# Patient Record
Sex: FEMALE | Race: WHITE | URBAN - METROPOLITAN AREA
[De-identification: names, ages, dates, MRNs, and addresses within clinical notes are randomized per-mention and may not be internally consistent; named-entity substitution may affect disease eponyms.]

---

## 2017-11-09 ENCOUNTER — OFFICE VISIT (OUTPATIENT)
Dept: FAMILY MEDICINE CLINIC | Age: 22
End: 2017-11-09

## 2017-11-09 VITALS
TEMPERATURE: 98.4 F | SYSTOLIC BLOOD PRESSURE: 102 MMHG | RESPIRATION RATE: 24 BRPM | DIASTOLIC BLOOD PRESSURE: 62 MMHG | OXYGEN SATURATION: 98 % | HEIGHT: 65 IN | HEART RATE: 87 BPM | BODY MASS INDEX: 23.69 KG/M2 | WEIGHT: 142.2 LBS

## 2017-11-09 DIAGNOSIS — Z00.00 ROUTINE GENERAL MEDICAL EXAMINATION AT A HEALTH CARE FACILITY: Primary | ICD-10-CM

## 2017-11-09 DIAGNOSIS — M54.31 SCIATICA OF RIGHT SIDE: ICD-10-CM

## 2017-11-09 RX ORDER — NORGESTIMATE AND ETHINYL ESTRADIOL 7DAYSX3 28
KIT ORAL
COMMUNITY
End: 2020-12-07

## 2017-11-09 NOTE — MR AVS SNAPSHOT
Visit Information Date & Time Provider Department Dept. Phone Encounter #  
 11/9/2017  8:00 AM Shelby Livingston MD [facility] [phone] [id number] Upcoming Health Maintenance Date Due DTaP/Tdap/Td series (1 - Tdap) 7/17/2017 PAP AKA CERVICAL CYTOLOGY 7/17/2017 Influenza Age 5 to Adult 8/1/2017 HPV AGE 9Y-26Y (2 of 3 - Female 3 Dose Series) 1/4/2018 Allergies as of 11/9/2017  Review Complete On: 11/9/2017 By: Shelby Livingston MD  
  
 Severity Noted Reaction Type Reactions Sulfa (Sulfonamide Antibiotics)  11/09/2017    Swelling Current Immunizations  Never Reviewed No immunizations on file. Not reviewed this visit You Were Diagnosed With   
  
 Codes Comments Routine general medical examination at a health care facility    -  Primary ICD-10-CM: Z00.00 ICD-9-CM: V70.0 Sciatica of right side     ICD-10-CM: M54.31 
ICD-9-CM: 724.3 Vitals BP Pulse Temp Resp Height(growth percentile) Weight(growth percentile) 102/62 (BP 1 Location: Left arm, BP Patient Position: Sitting) 87 98.4 °F (36.9 °C) (Oral) 24 5' 5\" (1.651 m) 142 lb 3.2 oz (64.5 kg) LMP SpO2 BMI OB Status Smoking Status 10/17/2017 98% 23.66 kg/m2 Having regular periods Never Smoker Vitals History BMI and BSA Data Body Mass Index Body Surface Area  
 23.66 kg/m 2 1.72 m 2 Your Updated Medication List  
  
   
This list is accurate as of: 11/9/17  8:22 AM.  Always use your most recent med list.  
  
  
  
  
 ORTHO TRI-CYCLEN (28) 0.18/0.215/0.25 mg-35 mcg (28) Tab Generic drug:  norgestimate-ethinyl estradiol Take  by mouth. We Performed the Following REFERRAL TO PHYSICAL THERAPY [MSE99 Custom] Comments:  
 Please evaluate patient and continue treatment for right lumbar/hip pain. [facility] Kaila Valdez Referral Information Referral ID Referred By Referred To [id number] Lester Tomlinson Not Available Visits Status Start Date End Date 1 New Request 11/9/17 11/9/18 If your referral has a status of pending review or denied, additional information will be sent to support the outcome of this decision. Patient Instructions Anticipatory guidance and recommendations provided verbally and with printed information. Return for routine follow up in 1 year, sooner with any problems. Well Visit, Ages 25 to 48: Care Instructions Your Care Instructions Physical exams can help you stay healthy. Your doctor has checked your overall health and may have suggested ways to take good care of yourself. He or she also may have recommended tests. At home, you can help prevent illness with healthy eating, regular exercise, and other steps. Follow-up care is a key part of your treatment and safety. Be sure to make and go to all appointments, and call your doctor if you are having problems. It's also a good idea to know your test results and keep a list of the medicines you take. How can you care for yourself at home? · Reach and stay at a healthy weight. This will lower your risk for many problems, such as obesity, diabetes, heart disease, and high blood pressure. · Get at least 30 minutes of physical activity on most days of the week. Walking is a good choice. You also may want to do other activities, such as running, swimming, cycling, or playing tennis or team sports. Discuss any changes in your exercise program with your doctor. · Do not smoke or allow others to smoke around you. If you need help quitting, talk to your doctor about stop-smoking programs and medicines. These can increase your chances of quitting for good. · Talk to your doctor about whether you have any risk factors for sexually transmitted infections (STIs). Having one sex partner (who does not have STIs and does not have sex with anyone else) is a good way to avoid these infections. · Use birth control if you do not want to have children at this time. Talk with your doctor about the choices available and what might be best for you. · Protect your skin from too much sun. When you're outdoors from 10 a.m. to 4 p.m., stay in the shade or cover up with clothing and a hat with a wide brim. Wear sunglasses that block UV rays. Even when it's cloudy, put broad-spectrum sunscreen (SPF 30 or higher) on any exposed skin. · See a dentist one or two times a year for checkups and to have your teeth cleaned. · Wear a seat belt in the car. · Drink alcohol in moderation, if at all. That means no more than 2 drinks a day for men and 1 drink a day for women. Follow your doctor's advice about when to have certain tests. These tests can spot problems early. For everyone · Cholesterol. Have the fat (cholesterol) in your blood tested after age 21. Your doctor will tell you how often to have this done based on your age, family history, or other things that can increase your risk for heart disease. · Blood pressure. Have your blood pressure checked during a routine doctor visit. Your doctor will tell you how often to check your blood pressure based on your age, your blood pressure results, and other factors. · Vision. Talk with your doctor about how often to have a glaucoma test. 
· Diabetes. Ask your doctor whether you should have tests for diabetes. · Colon cancer. Have a test for colon cancer at age 48. You may have one of several tests. If you are younger than 48, you may need a test earlier if you have any risk factors. Risk factors include whether you already had a precancerous polyp removed from your colon or whether your parent, brother, sister, or child has had colon cancer. For women · Breast exam and mammogram. Talk to your doctor about when you should have a clinical breast exam and a mammogram. Medical experts differ on whether and how often women under 50 should have these tests.  Your doctor can help you decide what is right for you. · Pap test and pelvic exam. Begin Pap tests at age 24. A Pap test is the best way to find cervical cancer. The test often is part of a pelvic exam. Ask how often to have this test. 
· Tests for sexually transmitted infections (STIs). Ask whether you should have tests for STIs. You may be at risk if you have sex with more than one person, especially if your partners do not wear condoms. For men · Tests for sexually transmitted infections (STIs). Ask whether you should have tests for STIs. You may be at risk if you have sex with more than one person, especially if you do not wear a condom. · Testicular cancer exam. Ask your doctor whether you should check your testicles regularly. · Prostate exam. Talk to your doctor about whether you should have a blood test (called a PSA test) for prostate cancer. Experts differ on whether and when men should have this test. Some experts suggest it if you are older than 39 and are -American or have a father or brother who got prostate cancer when he was younger than 72. When should you call for help? Watch closely for changes in your health, and be sure to contact your doctor if you have any problems or symptoms that concern you. Where can you learn more? Go to http://bobbi-daniel.info/. Enter P072 in the search box to learn more about \"Well Visit, Ages 25 to 48: Care Instructions. \" Current as of: May 12, 2017 Content Version: 11.4 © 7766-8631 Healthwise, Incorporated. Care instructions adapted under license by [employer] (which disclaims liability or warranty for this information). If you have questions about a medical condition or this instruction, always ask your healthcare professional. [place] [address] any warranty or liability for your use of this information. Introducing [facility]!    
 [employer] introduces [employer] patient portal. Now you can access parts of your medical record, email your doctor's office, and request medication refills online. 1. In your internet browser, go to [email or web address]. [email or web address] 2. Click on the First Time User? Click Here link in the Sign In box. You will see the New Member Sign Up page. 3. Enter your Pixelpipe Access Code exactly as it appears below. You will not need to use this code after youve completed the sign-up process. If you do not sign up before the expiration date, you must request a new code. · Pixelpipe Access Code: [email or web address] Expires: 2/7/2018  7:55 AM 
 
4. Enter the last four digits of your Social Security Number (xxxx) and Date of Birth (mm/dd/yyyy) as indicated and click Submit. You will be taken to the next sign-up page. 5. Create a Pixelpipe ID. This will be your Pixelpipe login ID and cannot be changed, so think of one that is secure and easy to remember. 6. Create a Pixelpipe password. You can change your password at any time. 7. Enter your Password Reset Question and Answer. This can be used at a later time if you forget your password. 8. Enter your e-mail address. You will receive e-mail notification when new information is available in [address] 9. Click Sign Up. You can now view and download portions of your medical record. 10. Click the Download Summary menu link to download a portable copy of your medical information. If you have questions, please visit the Frequently Asked Questions section of the Pixelpipe website. Remember, Pixelpipe is NOT to be used for urgent needs. For medical emergencies, dial 911. Now available from your iPhone and Android! Please provide this summary of care documentation to your next provider. Your primary care clinician is listed as Vonnie Laws. If you have any questions after today's visit, please call [phone].

## 2017-11-09 NOTE — PROGRESS NOTES
Fred Duran is a 25 y.o. female here to establish care       Fred Duran is a 25 y.o. female (: 1995) presenting to address:    Chief Complaint   Patient presents with   [facility]     pt here to establish care. pt also reports concerns        Vitals:    17 0800   BP: 102/62   Pulse: 87   Resp: 24   SpO2: 98%   Weight: 142 lb 3.2 oz (64.5 kg)   Height: 5' 5\" (1.651 m)   PainSc:   0 - No pain       Hearing/Vision:   No exam data present    Learning Assessment:     Learning Assessment 2017   PRIMARY LEARNER Patient   HIGHEST LEVEL OF EDUCATION - PRIMARY LEARNER  SOME COLLEGE   BARRIERS PRIMARY LEARNER NONE     NONE   PRIMARY LANGUAGE ENGLISH   LEARNER PREFERENCE PRIMARY READING   ANSWERED BY patient   RELATIONSHIP SELF     Depression Screening:     PHQ over the last two weeks 2017   Little interest or pleasure in doing things Not at all   Feeling down, depressed or hopeless Not at all   Total Score PHQ 2 0     Fall Risk Assessment:   No flowsheet data found. Abuse Screening:   No flowsheet data found. Coordination of Care Questionaire:   1. Have you been to the ER, urgent care clinic since your last visit? Hospitalized since your last visit? NO    2. Have you seen or consulted any other health care providers outside of the [address] [place] [address] since your last visit? Include any pap smears or colon screening. NO    Advanced Directive:   1. Do you have an Advanced Directive? NO    2. Would you like information on Advanced Directives?  NO
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
## 2017-11-09 NOTE — PATIENT INSTRUCTIONS
Anticipatory guidance and recommendations provided verbally and with printed information. Return for routine follow up in 1 year, sooner with any problems. Well Visit, Ages 25 to 48: Care Instructions  Your Care Instructions    Physical exams can help you stay healthy. Your doctor has checked your overall health and may have suggested ways to take good care of yourself. He or she also may have recommended tests. At home, you can help prevent illness with healthy eating, regular exercise, and other steps. Follow-up care is a key part of your treatment and safety. Be sure to make and go to all appointments, and call your doctor if you are having problems. It's also a good idea to know your test results and keep a list of the medicines you take. How can you care for yourself at home? · Reach and stay at a healthy weight. This will lower your risk for many problems, such as obesity, diabetes, heart disease, and high blood pressure. · Get at least 30 minutes of physical activity on most days of the week. Walking is a good choice. You also may want to do other activities, such as running, swimming, cycling, or playing tennis or team sports. Discuss any changes in your exercise program with your doctor. · Do not smoke or allow others to smoke around you. If you need help quitting, talk to your doctor about stop-smoking programs and medicines. These can increase your chances of quitting for good. · Talk to your doctor about whether you have any risk factors for sexually transmitted infections (STIs). Having one sex partner (who does not have STIs and does not have sex with anyone else) is a good way to avoid these infections. · Use birth control if you do not want to have children at this time. Talk with your doctor about the choices available and what might be best for you. · Protect your skin from too much sun.  When you're outdoors from 10 a.m. to 4 p.m., stay in the shade or cover up with clothing and a hat with a wide brim. Wear sunglasses that block UV rays. Even when it's cloudy, put broad-spectrum sunscreen (SPF 30 or higher) on any exposed skin. · See a dentist one or two times a year for checkups and to have your teeth cleaned. · Wear a seat belt in the car. · Drink alcohol in moderation, if at all. That means no more than 2 drinks a day for men and 1 drink a day for women. Follow your doctor's advice about when to have certain tests. These tests can spot problems early. For everyone  · Cholesterol. Have the fat (cholesterol) in your blood tested after age 21. Your doctor will tell you how often to have this done based on your age, family history, or other things that can increase your risk for heart disease. · Blood pressure. Have your blood pressure checked during a routine doctor visit. Your doctor will tell you how often to check your blood pressure based on your age, your blood pressure results, and other factors. · Vision. Talk with your doctor about how often to have a glaucoma test.  · Diabetes. Ask your doctor whether you should have tests for diabetes. · Colon cancer. Have a test for colon cancer at age 48. You may have one of several tests. If you are younger than 48, you may need a test earlier if you have any risk factors. Risk factors include whether you already had a precancerous polyp removed from your colon or whether your parent, brother, sister, or child has had colon cancer. For women  · Breast exam and mammogram. Talk to your doctor about when you should have a clinical breast exam and a mammogram. Medical experts differ on whether and how often women under 50 should have these tests. Your doctor can help you decide what is right for you. · Pap test and pelvic exam. Begin Pap tests at age 24. A Pap test is the best way to find cervical cancer. The test often is part of a pelvic exam. Ask how often to have this test.  · Tests for sexually transmitted infections (STIs).  Ask whether you should have tests for STIs. You may be at risk if you have sex with more than one person, especially if your partners do not wear condoms. For men  · Tests for sexually transmitted infections (STIs). Ask whether you should have tests for STIs. You may be at risk if you have sex with more than one person, especially if you do not wear a condom. · Testicular cancer exam. Ask your doctor whether you should check your testicles regularly. · Prostate exam. Talk to your doctor about whether you should have a blood test (called a PSA test) for prostate cancer. Experts differ on whether and when men should have this test. Some experts suggest it if you are older than 39 and are -American or have a father or brother who got prostate cancer when he was younger than 72. When should you call for help? Watch closely for changes in your health, and be sure to contact your doctor if you have any problems or symptoms that concern you. Where can you learn more? Go to http://bobbi-daniel.info/. Enter P072 in the search box to learn more about \"Well Visit, Ages 25 to 48: Care Instructions. \"  Current as of: May 12, 2017  Content Version: 11.4  © 3821-0024 Healthwise, Incorporated. Care instructions adapted under license by [employer] (which disclaims liability or warranty for this information). If you have questions about a medical condition or this instruction, always ask your healthcare professional. [place] [address] any warranty or liability for your use of this information.

## 2017-12-07 ENCOUNTER — TELEPHONE (OUTPATIENT)
Dept: FAMILY MEDICINE CLINIC | Age: 22
End: 2017-12-07

## 2017-12-07 DIAGNOSIS — M54.9 UPPER BACK PAIN: Primary | ICD-10-CM

## 2017-12-07 DIAGNOSIS — M54.2 NECK PAIN: ICD-10-CM

## 2017-12-07 NOTE — TELEPHONE ENCOUNTER
Pt called stating she was seen at [facility] today for physical therapy and wanted them to treat her upper back and neck pain but they stated she needed a new referral since it stated treatment for the right lumbar/ hip pain.

## 2017-12-07 NOTE — TELEPHONE ENCOUNTER
Please advise patient that referral request for physical therapy evaluation and treatment of upper back and neck pain is being submitted.

## 2017-12-13 ENCOUNTER — OFFICE VISIT (OUTPATIENT)
Dept: FAMILY MEDICINE CLINIC | Age: 22
End: 2017-12-13

## 2017-12-13 VITALS
HEIGHT: 65 IN | HEART RATE: 109 BPM | DIASTOLIC BLOOD PRESSURE: 66 MMHG | WEIGHT: 139.2 LBS | SYSTOLIC BLOOD PRESSURE: 104 MMHG | RESPIRATION RATE: 18 BRPM | BODY MASS INDEX: 23.19 KG/M2 | OXYGEN SATURATION: 98 %

## 2017-12-13 DIAGNOSIS — L70.0 CYSTIC ACNE VULGARIS: Primary | ICD-10-CM

## 2017-12-13 NOTE — MR AVS SNAPSHOT
Visit Information Date & Time Provider Department Dept. Phone Encounter #  
 12/13/2017 10:45 AM Morro Dawn MD [address] [place] [address] [phone] [id number] Upcoming Health Maintenance Date Due DTaP/Tdap/Td series (1 - Tdap) 7/17/2017 PAP AKA CERVICAL CYTOLOGY 7/17/2017 Influenza Age 5 to Adult 8/1/2017 HPV AGE 9Y-26Y (2 of 3 - Female 3 Dose Series) 1/4/2018 Allergies as of 12/13/2017  Review Complete On: 12/13/2017 By: Morro Dawn MD  
  
 Severity Noted Reaction Type Reactions Sulfa (Sulfonamide Antibiotics)  11/09/2017    Swelling Current Immunizations  Never Reviewed No immunizations on file. Not reviewed this visit You Were Diagnosed With   
  
 Codes Comments Cystic acne vulgaris    -  Primary ICD-10-CM: L70.0 ICD-9-CM: 706.1 Vitals BP Pulse Resp Height(growth percentile) Weight(growth percentile) SpO2  
 104/66 (BP 1 Location: Left arm, BP Patient Position: Sitting) (!) 109 18 5' 5\" (1.651 m) 139 lb 3.2 oz (63.1 kg) 98% BMI OB Status Smoking Status 23.16 kg/m2 Having regular periods Never Smoker BMI and BSA Data Body Mass Index Body Surface Area  
 23.16 kg/m 2 1.7 m 2 Your Updated Medication List  
  
   
This list is accurate as of: 12/13/17 10:58 AM.  Always use your most recent med list.  
  
  
  
  
 ORTHO TRI-CYCLEN (28) 0.18/0.215/0.25 mg-35 mcg (28) Tab Generic drug:  norgestimate-ethinyl estradiol Take  by mouth. We Performed the Following REFERRAL TO DERMATOLOGY [REF19 Custom] Comments:  
 Please evaluate patient for long history of cystic acne, previously requiring treatment with Accutane, now with topical agents less effective Referral Information Referral ID Referred By Referred To  
  
 [id number] Fred Stallworth   
   [address] [facility] Phone: [phone] Fax: [phone] Visits Status Start Date End Date 1 New Request 12/13/17 12/13/18 If your referral has a status of pending review or denied, additional information will be sent to support the outcome of this decision. Introducing [facility] SERVICES! Olivier Corrigan introduces Voya.ge patient portal. Now you can access parts of your medical record, email your doctor's office, and request medication refills online. 1. In your internet browser, go to [email or web address] 2. Click on the First Time User? Click Here link in the Sign In box. You will see the New Member Sign Up page. 3. Enter your Voya.ge Access Code exactly as it appears below. You will not need to use this code after youve completed the sign-up process. If you do not sign up before the expiration date, you must request a new code. · Voya.ge Access Code: [email or web address] Expires: 2/7/2018  7:55 AM 
 
4. Enter the last four digits of your Social Security Number (xxxx) and Date of Birth (mm/dd/yyyy) as indicated and click Submit. You will be taken to the next sign-up page. 5. Create a Voya.ge ID. This will be your Voya.ge login ID and cannot be changed, so think of one that is secure and easy to remember. 6. Create a Voya.ge password. You can change your password at any time. 7. Enter your Password Reset Question and Answer. This can be used at a later time if you forget your password. 8. Enter your e-mail address. You will receive e-mail notification when new information is available in [address] 9. Click Sign Up. You can now view and download portions of your medical record. 10. Click the Download Summary menu link to download a portable copy of your medical information. If you have questions, please visit the Frequently Asked Questions section of the Voya.ge website. Remember, Voya.ge is NOT to be used for urgent needs. For medical emergencies, dial 911. Now available from your iPhone and Android! Please provide this summary of care documentation to your next provider. Your primary care clinician is listed as Karon Somers. If you have any questions after today's visit, please call [phone].

## 2017-12-13 NOTE — PROGRESS NOTES
HISTORY OF PRESENT ILLNESS  Ruthie De Oliveira is a 25 y.o. female. Skin Problem   The history is provided by the patient and medical records. This is a chronic problem. Patient Active Problem List   Diagnosis Code    Sciatica of right side M54.31       Current Outpatient Prescriptions:     norgestimate-ethinyl estradiol (ORTHO TRI-CYCLEN, 28,) 0.18/0.215/0.25 mg-35 mcg (28) tab, Take  by mouth., Disp: , Rfl:       Review of Systems   Constitutional: Negative for fever and weight loss. Skin:        Long history of cystic acne involving the face, neck, shoulders and chest, previously requiring treatment with Accutane, now with topical agents less effective     Visit Vitals    /66 (BP 1 Location: Left arm, BP Patient Position: Sitting)    Pulse (!) 109    Resp 18    Ht 5' 5\" (1.651 m)    Wt 139 lb 3.2 oz (63.1 kg)    SpO2 98%    BMI 23.16 kg/m2     Physical Exam   Constitutional: She is oriented to person, place, and time. She appears well-developed and well-nourished. HENT:   Head: Normocephalic. Eyes: EOM are normal.   Neck: Neck supple. Cardiovascular: Normal rate. Pulmonary/Chest: Effort normal.   Musculoskeletal: She exhibits no edema. Neurological: She is alert and oriented to person, place, and time. Skin: Skin is warm and dry. Acne- face, neck    Psychiatric: She has a normal mood and affect. Her behavior is normal.   Nursing note and vitals reviewed. ASSESSMENT and PLAN    ICD-10-CM ICD-9-CM    1.  Cystic acne vulgaris L70.0 706.1 REFERRAL TO DERMATOLOGY

## 2018-05-30 ENCOUNTER — OFFICE VISIT (OUTPATIENT)
Dept: FAMILY MEDICINE CLINIC | Age: 23
End: 2018-05-30

## 2018-05-30 VITALS
BODY MASS INDEX: 23.16 KG/M2 | HEART RATE: 69 BPM | SYSTOLIC BLOOD PRESSURE: 102 MMHG | RESPIRATION RATE: 16 BRPM | DIASTOLIC BLOOD PRESSURE: 62 MMHG | TEMPERATURE: 99.3 F | WEIGHT: 139 LBS | OXYGEN SATURATION: 98 % | HEIGHT: 65 IN

## 2018-05-30 DIAGNOSIS — M46.1 SACROILIITIS (HCC): Primary | ICD-10-CM

## 2018-05-30 DIAGNOSIS — E16.1 REACTIVE HYPOGLYCEMIA: ICD-10-CM

## 2018-05-30 DIAGNOSIS — F41.8 DEPRESSION WITH ANXIETY: ICD-10-CM

## 2018-05-30 PROBLEM — M54.31 SCIATICA OF RIGHT SIDE: Status: RESOLVED | Noted: 2017-11-09 | Resolved: 2018-05-30

## 2018-05-30 RX ORDER — SERTRALINE HYDROCHLORIDE 50 MG/1
50 TABLET, FILM COATED ORAL DAILY
Qty: 30 TAB | Refills: 0 | Status: SHIPPED | OUTPATIENT
Start: 2018-05-30 | End: 2018-07-09 | Stop reason: SDUPTHER

## 2018-05-30 NOTE — PROGRESS NOTES
Deisy Torres is a 25 y.o. female here for follow up       Deisy Torres is a 25 y.o. female (: 1995) presenting to address:    Chief Complaint   Patient presents with    Back Pain     pt states she's here for a follow up. no improvement with PT    Low Blood Sugar     pt also reports low BS more frequently        Vitals:    18 1454   BP: 102/62   Pulse: 69   Resp: 16   SpO2: 98%   Weight: 139 lb (63 kg)   Height: 5' 5\" (1.651 m)   PainSc:   0 - No pain       Hearing/Vision:   No exam data present    Learning Assessment:     Learning Assessment 2017   PRIMARY LEARNER Patient   HIGHEST LEVEL OF EDUCATION - PRIMARY LEARNER  SOME COLLEGE   BARRIERS PRIMARY LEARNER NONE     NONE   PRIMARY LANGUAGE ENGLISH   LEARNER PREFERENCE PRIMARY READING   ANSWERED BY patient   RELATIONSHIP SELF     Depression Screening:     PHQ over the last two weeks 2018   Little interest or pleasure in doing things Not at all   Feeling down, depressed or hopeless Not at all   Total Score PHQ 2 0     Fall Risk Assessment:   No flowsheet data found. Abuse Screening:   No flowsheet data found. Coordination of Care Questionaire:   1. Have you been to the ER, urgent care clinic since your last visit? Hospitalized since your last visit? NO    2. Have you seen or consulted any other health care providers outside of the [address] [place] [address] since your last visit? Include any pap smears or colon screening. NO    Advanced Directive:   1. Do you have an Advanced Directive? NO    2. Would you like information on Advanced Directives?  NO

## 2018-05-30 NOTE — MR AVS SNAPSHOT
Roberto Bonilla 
 
 
 [address] [address] [facility] [id number] [phone] Patient: Sacha Amaro MRN: [id number] :1995 Visit Information Date & Time Provider Department Dept. Phone Encounter #  
 2018  3:00 PM Erwin Mina, [employer] [phone] [id number] Upcoming Health Maintenance Date Due DTaP/Tdap/Td series (1 - Tdap) 2017 PAP AKA CERVICAL CYTOLOGY 2017 HPV Age 9Y-34Y (2 of 3 - Female 3 Dose Series) 2018 Influenza Age 5 to Adult 2018 Allergies as of 2018  Review Complete On: 2018 By: Erwin Mina MD  
  
 Severity Noted Reaction Type Reactions Sulfa (Sulfonamide Antibiotics)  2017    Swelling Current Immunizations  Never Reviewed No immunizations on file. Not reviewed this visit You Were Diagnosed With   
  
 Codes Comments [facility]    -  Primary ICD-10-CM: M46.1 ICD-9-CM: 720.2 Reactive hypoglycemia     ICD-10-CM: E16.1 ICD-9-CM: 251.2 Depression with anxiety     ICD-10-CM: F41.8 ICD-9-CM: 300.4 Vitals BP Pulse Temp Resp Height(growth percentile) Weight(growth percentile) 102/62 (BP 1 Location: Left arm, BP Patient Position: Sitting) 69 99.3 °F (37.4 °C) (Oral) 16 5' 5\" (1.651 m) 139 lb (63 kg) SpO2 BMI OB Status Smoking Status 98% 23.13 kg/m2 Having regular periods Never Smoker Vitals History BMI and BSA Data Body Mass Index Body Surface Area  
 23.13 kg/m 2 1.7 m 2 [facility] [phone] Your Updated Medication List  
  
   
This list is accurate as of 18  3:28 PM.  Always use your most recent med list.  
  
  
  
  
 ORTHO TRI-CYCLEN (28) 0.18/0.215/0.25 mg-35 mcg (28) Tab Generic drug:  norgestimate-ethinyl estradiol Take  by mouth. sertraline 50 mg tablet Commonly known as:  ZOLOFT Take 1 Tab by mouth daily. Prescriptions Printed Refills  
 sertraline (ZOLOFT) 50 mg tablet 0 Sig: Take 1 Tab by mouth daily. Class: Print Route: Oral  
  
We Performed the Following REFERRAL TO NUTRITION [REF50 Custom] Comments:  
 Please evaluate patient for presumed reactive hypoglycemia symptoms, pleas instruct in frequent small high protein/complex carbohydrate meals. REFERRAL TO PHYSIATRY [SEC859 Custom] Comments:  
 Persistent right sacroiliac area pain, no improvement with PT Referral Information Referral ID Referred By Referred To  
  
 [id number] Mitchel Hernandez MD   
   [address] [place] [address] [facility] [address] [facility] Phone: [phone] Fax: [phone] Visits Status Start Date End Date 1 New Request 5/30/18 5/30/19 If your referral has a status of pending review or denied, additional information will be sent to support the outcome of this decision. Referral ID Referred By Referred To  
 [id number] [facility] [address] [place] [address]  
   [address] [facility] Phone: [phone] Fax: [phone] Visits Status Start Date End Date 1 New Request 5/30/18 5/30/19 If your referral has a status of pending review or denied, additional information will be sent to support the outcome of this decision. Patient Instructions Physical medicine referral 
Frequent small high protein/complex carbohydrate meals Follow up for new symptoms, worsening symptoms or failure to improve. Begin sertraline 50 mg daily Return for follow up in 3-4 weeks, sooner with any problems. Sacroiliac Pain: Exercises Your Care Instructions Here are some examples of typical rehabilitation exercises for your condition. Start each exercise slowly. Ease off the exercise if you start to have pain. Your doctor or physical therapist will tell you when you can start these exercises and which ones will work best for you. How to do the exercises Knee-to-chest stretch 1. Do not do the knee-to-chest exercise if it causes or increases back or leg pain. 2. Lie on your back with your knees bent and your feet flat on the floor. You can put a small pillow under your head and neck if it is more comfortable. 3. Grasp your hands under one knee and bring the knee to your chest, keeping the other foot flat on the floor. 4. Keep your lower back pressed to the floor. Hold for at least 15 to 30 seconds. 5. Relax and lower the knee to the starting position. Repeat with the other leg. 6. Repeat 2 to 4 times with each leg. 7. To get more stretch, keep your other leg flat on the floor while pulling your knee to your chest. 
Bridging 1. Lie on your back with both knees bent. Your knees should be bent about 90 degrees. 2. Tighten your belly muscles by pulling in your belly button toward your spine. Then push your feet into the floor, squeeze your buttocks, and lift your hips off the floor until your shoulders, hips, and knees are all in a straight line. 3. Hold for about 6 seconds as you continue to breathe normally, and then slowly lower your hips back down to the floor and rest for up to 10 seconds. 4. Repeat 8 to 12 times. Hip extension 1. Get down on your hands and knees on the floor. 2. Keeping your back and neck straight, lift one leg straight out behind you. When you lift your leg, keep your hips level. Don't let your back twist, and don't let your hip drop toward the floor. 3. Hold for 6 seconds. Repeat 8 to 12 times with each leg. 4. If you feel steady and strong when you do this exercise, you can make it more difficult.  To do this, when you lift your leg, also lift the opposite arm straight out in front of you. For example, lift the left leg and the right arm at the same time. (This is sometimes called the \"bird dog exercise. \") Hold for 6 seconds, and repeat 8 to 12 times on each side. Clamshell 1. Lie on your side with a pillow under your head. Keep your feet and knees together and your knees bent. 2. Raise your top knee, but keep your feet together. Do not let your hips roll back. Your legs should open up like a clamshell. 3. Hold for 6 seconds. 4. Slowly lower your knee back down. Rest for 10 seconds. 5. Repeat 8 to 12 times. 6. Switch to your other side and repeat steps 1 through 5. Hamstring wall stretch 1. Lie on your back in a doorway, with one leg through the open door. 2. Slide your affected leg up the wall to straighten your knee. You should feel a gentle stretch down the back of your leg. 1. Do not arch your back. 2. Do not bend either knee. 3. Keep one heel touching the floor and the other heel touching the wall. Do not point your toes. 3. Hold the stretch for at least 1 minute to begin. Then try to lengthen the time you hold the stretch to as long as 6 minutes. 4. Switch legs, and repeat steps 1 through 3. 
5. Repeat 2 to 4 times. 6. If you do not have a place to do this exercise in a doorway, there is another way to do it: 
7. Lie on your back, and bend one knee. 8. Loop a towel under the ball and toes of that foot, and hold the ends of the towel in your hands. 9. Straighten your knee, and slowly pull back on the towel. You should feel a gentle stretch down the back of your leg. 10. Switch legs, and repeat steps 1 through 3. 
11. Repeat 2 to 4 times. Lower abdominal strengthening 1. Lie on your back with your knees bent and your feet flat on the floor. 2. Tighten your belly muscles by pulling your belly button in toward your spine.  
3. Lift one foot off the floor and bring your knee toward your chest, so that your knee is straight above your hip and your leg is bent like the letter \"L. \" 
4. Lift the other knee up to the same position. 5. Lower one leg at a time to the starting position. 6. Keep alternating legs until you have lifted each leg 8 to 12 times. 7. Be sure to keep your belly muscles tight and your back still as you are moving your legs. Be sure to breathe normally. Piriformis stretch 1. Lie on your back with your legs straight. 2. Lift your affected leg, and bend your knee. With your opposite hand, reach across your body, and then gently pull your knee toward your opposite shoulder. 3. Hold the stretch for 15 to 30 seconds. 4. Switch legs and repeat steps 1 through 3. 
5. Repeat 2 to 4 times. Follow-up care is a key part of your treatment and safety. Be sure to make and go to all appointments, and call your doctor if you are having problems. It's also a good idea to know your test results and keep a list of the medicines you take. Where can you learn more? Go to http://bobbi-daniel.info/. Enter F022 in the search box to learn more about \"Sacroiliac Pain: Exercises. \" Current as of: March 21, 2017 Content Version: 11.4 © 7221-9228 Healthwise, Incorporated. Care instructions adapted under license by [employer] (which disclaims liability or warranty for this information). If you have questions about a medical condition or this instruction, always ask your healthcare professional. [place] [address] any warranty or liability for your use of this information. Introducing [facility] & HEALTH SERVICES! [employer] introduces Health[employer] patient portal. Now you can access parts of your medical record, email your doctor's office, and request medication refills online. 1. In your internet browser, go to [email or web address] 2. Click on the First Time User? Click Here link in the Sign In box. You will see the New Member Sign Up page. 3. Enter your real5D Access Code exactly as it appears below. You will not need to use this code after youve completed the sign-up process. If you do not sign up before the expiration date, you must request a new code. · real5D Access Code: [email or web address] Expires: 8/28/2018  3:28 PM 
 
4. Enter the last four digits of your Social Security Number (xxxx) and Date of Birth (mm/dd/yyyy) as indicated and click Submit. You will be taken to the next sign-up page. 5. Create a real5D ID. This will be your real5D login ID and cannot be changed, so think of one that is secure and easy to remember. 6. Create a real5D password. You can change your password at any time. 7. Enter your Password Reset Question and Answer. This can be used at a later time if you forget your password. 8. Enter your e-mail address. You will receive e-mail notification when new information is available in [address]. 9. Click Sign Up. You can now view and download portions of your medical record. 10. Click the Download Summary menu link to download a portable copy of your medical information. If you have questions, please visit the Frequently Asked Questions section of the real5D website. Remember, real5D is NOT to be used for urgent needs. For medical emergencies, dial 911. Now available from your iPhone and Android! Please provide this summary of care documentation to your next provider. Your primary care clinician is listed as Prerna Stearns. If you have any questions after today's visit, please call [phone].

## 2018-05-30 NOTE — PROGRESS NOTES
HISTORY OF PRESENT ILLNESS  Valerie Peck is a 25 y.o. female. Back Pain    The history is provided by the patient. This is a chronic problem. Pertinent negatives include no chest pain, no fever, no weight loss and no headaches. Low Blood Sugar   Pertinent negatives include no chest pain, no headaches and no shortness of breath. Patient Active Problem List   Diagnosis Code    Sciatica of right side M54.31    Cystic acne vulgaris L70.0       Current Outpatient Prescriptions:     norgestimate-ethinyl estradiol (ORTHO TRI-CYCLEN, 28,) 0.18/0.215/0.25 mg-35 mcg (28) tab, Take  by mouth., Disp: , Rfl:     Allergies   Allergen Reactions    Sulfa (Sulfonamide Antibiotics) Swelling         Review of Systems   Constitutional: Negative for fever and weight loss. Eyes:        \"tunnel vision\" associated with presumed hypoglycemia   Respiratory: Negative for shortness of breath. Cardiovascular: Negative for chest pain and palpitations. Musculoskeletal: Positive for back pain ( chronic right lumbar/hip pain since [facility] 10/2016, no improvement with physical therapy). Neurological: Positive for dizziness (lightheaded with presumed hypoglycemia). Negative for loss of consciousness and headaches. Endo/Heme/Allergies:        Sensation of fatigue light headedness, \"tunnel vision\" a few hours after eating, seems to improve if she eats but not as much recently   Psychiatric/Behavioral: Positive for depression (episodes of tearfulness, decreased motivation, previously did well on sertraline 100 mg daily). Negative for suicidal ideas. The patient is nervous/anxious (generalized, no panic symptoms). The patient does not have insomnia.       Visit Vitals    /62 (BP 1 Location: Left arm, BP Patient Position: Sitting)    Pulse 69    Temp 99.3 °F (37.4 °C) (Oral)    Resp 16    Ht 5' 5\" (1.651 m)    Wt 139 lb (63 kg)    SpO2 98%    BMI 23.13 kg/m2     Physical Exam   Constitutional: She is oriented to person, place, and time. She appears well-developed and well-nourished. HENT:   Head: Normocephalic. Eyes: Conjunctivae and EOM are normal.   Neck: Neck supple. Cardiovascular: Normal rate, regular rhythm and normal heart sounds. Pulmonary/Chest: Effort normal and breath sounds normal.   Abdominal: Soft. There is no tenderness. Musculoskeletal: She exhibits no edema. Back exam reveals right sacroiliac area tenderness to palpation, negative straight leg raise and positive VALARIE on the right, LE muscle strength grossly intact and symmetrical   Neurological: She is alert and oriented to person, place, and time. Skin: Skin is warm and dry. Psychiatric: She has a normal mood and affect. Her behavior is normal.   Tearful when discussing history of symptoms of depression   Nursing note and vitals reviewed. ASSESSMENT and PLAN    ICD-10-CM ICD-9-CM    1. Sacroiliitis (HCC) M46.1 720.2 REFERRAL TO PHYSIATRY   2. Reactive hypoglycemia E16.1 251.2 REFERRAL TO NUTRITION   3. Depression with anxiety F41.8 300.4 sertraline (ZOLOFT) 50 mg tablet   Physical medicine referral  Frequent small high protein/complex carbohydrate meals - nutrition referral  Follow up for new symptoms, worsening symptoms or failure to improve. Begin sertraline 50 mg daily  Return for follow up in 3-4 weeks, sooner with any problems.

## 2018-05-30 NOTE — PATIENT INSTRUCTIONS
Physical medicine referral  Frequent small high protein/complex carbohydrate meals  Follow up for new symptoms, worsening symptoms or failure to improve. Begin sertraline 50 mg daily  Return for follow up in 3-4 weeks, sooner with any problems. Sacroiliac Pain: Exercises  Your Care Instructions  Here are some examples of typical rehabilitation exercises for your condition. Start each exercise slowly. Ease off the exercise if you start to have pain. Your doctor or physical therapist will tell you when you can start these exercises and which ones will work best for you. How to do the exercises  Knee-to-chest stretch    1. Do not do the knee-to-chest exercise if it causes or increases back or leg pain. 2. Lie on your back with your knees bent and your feet flat on the floor. You can put a small pillow under your head and neck if it is more comfortable. 3. Grasp your hands under one knee and bring the knee to your chest, keeping the other foot flat on the floor. 4. Keep your lower back pressed to the floor. Hold for at least 15 to 30 seconds. 5. Relax and lower the knee to the starting position. Repeat with the other leg. 6. Repeat 2 to 4 times with each leg. 7. To get more stretch, keep your other leg flat on the floor while pulling your knee to your chest.  Bridging    1. Lie on your back with both knees bent. Your knees should be bent about 90 degrees. 2. Tighten your belly muscles by pulling in your belly button toward your spine. Then push your feet into the floor, squeeze your buttocks, and lift your hips off the floor until your shoulders, hips, and knees are all in a straight line. 3. Hold for about 6 seconds as you continue to breathe normally, and then slowly lower your hips back down to the floor and rest for up to 10 seconds. 4. Repeat 8 to 12 times. Hip extension    1. Get down on your hands and knees on the floor.   2. Keeping your back and neck straight, lift one leg straight out behind you. When you lift your leg, keep your hips level. Don't let your back twist, and don't let your hip drop toward the floor. 3. Hold for 6 seconds. Repeat 8 to 12 times with each leg. 4. If you feel steady and strong when you do this exercise, you can make it more difficult. To do this, when you lift your leg, also lift the opposite arm straight out in front of you. For example, lift the left leg and the right arm at the same time. (This is sometimes called the \"bird dog exercise. \") Hold for 6 seconds, and repeat 8 to 12 times on each side. Clamshell    1. Lie on your side with a pillow under your head. Keep your feet and knees together and your knees bent. 2. Raise your top knee, but keep your feet together. Do not let your hips roll back. Your legs should open up like a clamshell. 3. Hold for 6 seconds. 4. Slowly lower your knee back down. Rest for 10 seconds. 5. Repeat 8 to 12 times. 6. Switch to your other side and repeat steps 1 through 5. Hamstring wall stretch    1. Lie on your back in a doorway, with one leg through the open door. 2. Slide your affected leg up the wall to straighten your knee. You should feel a gentle stretch down the back of your leg. 1. Do not arch your back. 2. Do not bend either knee. 3. Keep one heel touching the floor and the other heel touching the wall. Do not point your toes. 3. Hold the stretch for at least 1 minute to begin. Then try to lengthen the time you hold the stretch to as long as 6 minutes. 4. Switch legs, and repeat steps 1 through 3.  5. Repeat 2 to 4 times. 6. If you do not have a place to do this exercise in a doorway, there is another way to do it:  7. Lie on your back, and bend one knee. 8. Loop a towel under the ball and toes of that foot, and hold the ends of the towel in your hands. 9. Straighten your knee, and slowly pull back on the towel. You should feel a gentle stretch down the back of your leg.   10. Switch legs, and repeat steps 1 through 3.  11. Repeat 2 to 4 times. Lower abdominal strengthening    1. Lie on your back with your knees bent and your feet flat on the floor. 2. Tighten your belly muscles by pulling your belly button in toward your spine. 3. Lift one foot off the floor and bring your knee toward your chest, so that your knee is straight above your hip and your leg is bent like the letter \"L. \"  4. Lift the other knee up to the same position. 5. Lower one leg at a time to the starting position. 6. Keep alternating legs until you have lifted each leg 8 to 12 times. 7. Be sure to keep your belly muscles tight and your back still as you are moving your legs. Be sure to breathe normally. Piriformis stretch    1. Lie on your back with your legs straight. 2. Lift your affected leg, and bend your knee. With your opposite hand, reach across your body, and then gently pull your knee toward your opposite shoulder. 3. Hold the stretch for 15 to 30 seconds. 4. Switch legs and repeat steps 1 through 3.  5. Repeat 2 to 4 times. Follow-up care is a key part of your treatment and safety. Be sure to make and go to all appointments, and call your doctor if you are having problems. It's also a good idea to know your test results and keep a list of the medicines you take. Where can you learn more? Go to http://bobbi-daniel.info/. Enter N245 in the search box to learn more about \"Sacroiliac Pain: Exercises. \"  Current as of: March 21, 2017  Content Version: 11.4  © 6292-3147 [employer], Incorporated. Care instructions adapted under license by [employer] (which disclaims liability or warranty for this information). If you have questions about a medical condition or this instruction, always ask your healthcare professional. [address] any warranty or liability for your use of this information.

## 2018-07-09 ENCOUNTER — OFFICE VISIT (OUTPATIENT)
Dept: FAMILY MEDICINE CLINIC | Age: 23
End: 2018-07-09

## 2018-07-09 VITALS
HEIGHT: 65 IN | SYSTOLIC BLOOD PRESSURE: 110 MMHG | RESPIRATION RATE: 16 BRPM | OXYGEN SATURATION: 97 % | WEIGHT: 138.8 LBS | BODY MASS INDEX: 23.13 KG/M2 | DIASTOLIC BLOOD PRESSURE: 64 MMHG | HEART RATE: 93 BPM

## 2018-07-09 DIAGNOSIS — F41.8 DEPRESSION WITH ANXIETY: ICD-10-CM

## 2018-07-09 RX ORDER — SERTRALINE HYDROCHLORIDE 50 MG/1
50 TABLET, FILM COATED ORAL DAILY
Qty: 90 TAB | Refills: 1 | Status: SHIPPED | OUTPATIENT
Start: 2018-07-09 | End: 2019-05-01 | Stop reason: DRUGHIGH

## 2018-07-09 NOTE — MR AVS SNAPSHOT
[address] [place] [address]  [address] [facility] [address] [phone] Patient: Moses Deleon MRN: [id number] :1995 Visit Information Date & Time Provider Department Dept. Phone Encounter #  
 2018  3:30 PM Karon Somers, [address] [phone] [id number] Upcoming Health Maintenance Date Due DTaP/Tdap/Td series (1 - Tdap) 2017 PAP AKA CERVICAL CYTOLOGY 2017 HPV Age 9Y-34Y (2 of 3 - Female 3 Dose Series) 2018 Influenza Age 5 to Adult 2018 Allergies as of 2018  Review Complete On: 2018 By: Karon Somers MD  
  
 Severity Noted Reaction Type Reactions Sulfa (Sulfonamide Antibiotics)  2017    Swelling Current Immunizations  Never Reviewed No immunizations on file. Not reviewed this visit You Were Diagnosed With   
  
 Codes Comments Depression with anxiety     ICD-10-CM: F41.8 ICD-9-CM: 300.4 Vitals BP Pulse Resp Height(growth percentile) Weight(growth percentile) SpO2  
 110/64 (BP 1 Location: Left arm, BP Patient Position: Sitting) 93 16 5' 5\" (1.651 m) 138 lb 12.8 oz (63 kg) 97% BMI OB Status Smoking Status 23.1 kg/m2 Having regular periods Never Smoker BMI and BSA Data Body Mass Index Body Surface Area  
 23.1 kg/m 2 1.7 m 2 Preferred Pharmacy Pharmacy Name Phone [facility] [phone] Your Updated Medication List  
  
   
This list is accurate as of 18  3:55 PM.  Always use your most recent med list.  
  
  
  
  
 ORTHO TRI-CYCLEN (28) 0.18/0.215/0.25 mg-35 mcg (28) Tab Generic drug:  norgestimate-ethinyl estradiol Take  by mouth. sertraline 50 mg tablet Commonly known as:  ZOLOFT Take 1 Tab by mouth daily. Prescriptions Printed  Refills  
 sertraline (ZOLOFT) 50 mg tablet 1  
 Sig: Take 1 Tab by mouth daily. Class: Print Route: Oral  
  
Patient Instructions Continue current medications. Return for follow up in 6 months, sooner with any problems. Introducing \[facility]\"" & [facility] SERVICES! Cedrick Micaelaelie introduces [employer] patient portal. Now you can access parts of your medical record, email your doctor's office, and request medication refills online. 1. In your internet browser, go to [email or web address] 2. Click on the First Time User? Click Here link in the Sign In box. You will see the New Member Sign Up page. 3. Enter your [employer] Access Code exactly as it appears below. You will not need to use this code after youve completed the sign-up process. If you do not sign up before the expiration date, you must request a new code. · [employer] Access Code: [email or web address] Expires: 8/28/2018  3:28 PM 
 
4. Enter the last four digits of your Social Security Number (xxxx) and Date of Birth (mm/dd/yyyy) as indicated and click Submit. You will be taken to the next sign-up page. 5. Create a [employer] ID. This will be your [employer] login ID and cannot be changed, so think of one that is secure and easy to remember. 6. Create a [employer] password. You can change your password at any time. 7. Enter your Password Reset Question and Answer. This can be used at a later time if you forget your password. 8. Enter your e-mail address. You will receive e-mail notification when new information is available in [address] 9. Click Sign Up. You can now view and download portions of your medical record. 10. Click the Download Summary menu link to download a portable copy of your medical information. If you have questions, please visit the Frequently Asked Questions section of the [employer] website. Remember, [employer] is NOT to be used for urgent needs. For medical emergencies, dial 911. Now available from your iPhone and Android! Please provide this summary of care documentation to your next provider. Your primary care clinician is listed as Johnathan Messer. If you have any questions after today's visit, please call [phone].

## 2018-07-09 NOTE — PROGRESS NOTES
Bhakti Smith is a 25 y.o. female here for follow up       Bhakti Smith is a 25 y.o. female (: 1995) presenting to address:    Chief Complaint   Patient presents with    Depression     here for follow up/ refill        Vitals:    18 1536   BP: 110/64   Pulse: 93   Resp: 16   SpO2: 97%   Weight: 138 lb 12.8 oz (63 kg)   Height: 5' 5\" (1.651 m)   PainSc:   0 - No pain       Hearing/Vision:   No exam data present    Learning Assessment:     Learning Assessment 2017   PRIMARY LEARNER Patient   HIGHEST LEVEL OF EDUCATION - PRIMARY LEARNER  SOME COLLEGE   BARRIERS PRIMARY LEARNER NONE     NONE   PRIMARY LANGUAGE ENGLISH   LEARNER PREFERENCE PRIMARY READING   ANSWERED BY patient   RELATIONSHIP SELF     Depression Screening:     PHQ over the last two weeks 2018   Little interest or pleasure in doing things Not at all   Feeling down, depressed or hopeless Not at all   Total Score PHQ 2 0     Fall Risk Assessment:   No flowsheet data found. Abuse Screening:   No flowsheet data found. Coordination of Care Questionaire:   1. Have you been to the ER, urgent care clinic since your last visit? Hospitalized since your last visit? NO    2. Have you seen or consulted any other health care providers outside of the [address] [place] [address] since your last visit? Include any pap smears or colon screening. NO    Advanced Directive:   1. Do you have an Advanced Directive? NO    2. Would you like information on Advanced Directives?  NO

## 2018-07-09 NOTE — PROGRESS NOTES
HISTORY OF PRESENT ILLNESS  Pawan Engel is a 25 y.o. female. Depression   The history is provided by the patient and medical records. This is a chronic problem. Pertinent negatives include no chest pain. Patient Active Problem List   Diagnosis Code    Cystic acne vulgaris L70.0    Reactive hypoglycemia E16.1    Depression with anxiety F41.8       Current Outpatient Prescriptions:     sertraline (ZOLOFT) 50 mg tablet, Take 1 Tab by mouth daily. , Disp: 30 Tab, Rfl: 0    norgestimate-ethinyl estradiol (ORTHO TRI-CYCLEN, 28,) 0.18/0.215/0.25 mg-35 mcg (28) tab, Take  by mouth., Disp: , Rfl:       Review of Systems   Constitutional: Negative for fever and weight loss. Cardiovascular: Negative for chest pain and palpitations. Musculoskeletal: Positive for joint pain (sacroiliac pain somewhat improved, scheduled for injection next month). Psychiatric/Behavioral: Positive for depression (more motivated, less tearful). Negative for suicidal ideas. The patient is not nervous/anxious and does not have insomnia. Reports doing well on sertraline 50 mg daily without side effects, does not feel she needs a higher dose     Visit Vitals    /64 (BP 1 Location: Left arm, BP Patient Position: Sitting)    Pulse 93    Resp 16    Ht 5' 5\" (1.651 m)    Wt 138 lb 12.8 oz (63 kg)    SpO2 97%    BMI 23.1 kg/m2     Physical Exam   Constitutional: She is oriented to person, place, and time. She appears well-developed and well-nourished. HENT:   Head: Normocephalic. Eyes: Conjunctivae and EOM are normal.   Neck: Neck supple. Cardiovascular: Normal rate, regular rhythm and normal heart sounds. Pulmonary/Chest: Effort normal and breath sounds normal.   Musculoskeletal: She exhibits no edema. Neurological: She is alert and oriented to person, place, and time. Skin: Skin is warm and dry. Psychiatric: She has a normal mood and affect.  Her behavior is normal.   Nursing note and vitals reviewed. ASSESSMENT and PLAN    ICD-10-CM ICD-9-CM    1. Depression with anxiety F41.8 300.4 sertraline (ZOLOFT) 50 mg tablet   Continue current medications. Return for follow up in 6 months, sooner with any problems.
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
## 2019-05-01 ENCOUNTER — OFFICE VISIT (OUTPATIENT)
Dept: FAMILY MEDICINE CLINIC | Age: 24
End: 2019-05-01

## 2019-05-01 VITALS
OXYGEN SATURATION: 98 % | SYSTOLIC BLOOD PRESSURE: 98 MMHG | HEART RATE: 78 BPM | HEIGHT: 65 IN | BODY MASS INDEX: 21.92 KG/M2 | WEIGHT: 131.6 LBS | DIASTOLIC BLOOD PRESSURE: 62 MMHG | RESPIRATION RATE: 14 BRPM | TEMPERATURE: 98 F

## 2019-05-01 DIAGNOSIS — F41.8 DEPRESSION WITH ANXIETY: Primary | ICD-10-CM

## 2019-05-01 RX ORDER — SERTRALINE HYDROCHLORIDE 100 MG/1
TABLET, FILM COATED ORAL
Qty: 90 TAB | Refills: 1 | Status: SHIPPED | OUTPATIENT
Start: 2019-05-01 | End: 2020-12-07

## 2019-05-01 NOTE — PROGRESS NOTES
Syed Wilhelm is a 26 y.o. female (: 1995) presenting to address:    Chief Complaint   Patient presents with    Depression     wants to restart zoloft medication    Anxiety       Vitals:    19 1021   BP: 98/62   Pulse: 78   Resp: 14   Temp: 98 °F (36.7 °C)   TempSrc: Oral   SpO2: 98%   Weight: 131 lb 9.6 oz (59.7 kg)   Height: 5' 5\" (1.651 m)   PainSc:   0 - No pain   LMP: 2019       Hearing/Vision:   No exam data present    Learning Assessment:     Learning Assessment 2017   PRIMARY LEARNER Patient   HIGHEST LEVEL OF EDUCATION - PRIMARY LEARNER  SOME COLLEGE   BARRIERS PRIMARY LEARNER NONE     NONE   PRIMARY LANGUAGE ENGLISH   LEARNER PREFERENCE PRIMARY READING   ANSWERED BY patient   RELATIONSHIP SELF     Depression Screening:     3 most recent PHQ Screens 2018   Little interest or pleasure in doing things Not at all   Feeling down, depressed, irritable, or hopeless Not at all   Total Score PHQ 2 0     Fall Risk Assessment:   No flowsheet data found. Abuse Screening:   No flowsheet data found. Coordination of Care Questionaire:   1. Have you been to the ER, urgent care clinic since your last visit? Hospitalized since your last visit? NO    2. Have you seen or consulted any other health care providers outside of the [address] [place] [address] since your last visit? Include any pap smears or colon screening. NO    Advanced Directive:   1. Do you have an Advanced Directive? NO    2. Would you like information on Advanced Directives?  NO

## 2019-05-01 NOTE — PATIENT INSTRUCTIONS
Resume sertraline (Zoloft) 100 mg, 1/2 tablet daily at bedtime x3 days, if well-tolerated increase to 1 tablet at bedtime. Return for follow-up in 3 weeks, sooner with any problems. Anxiety Disorder: Care Instructions  Your Care Instructions    Anxiety is a normal reaction to stress. Difficult situations can cause you to have symptoms such as sweaty palms and a nervous feeling. In an anxiety disorder, the symptoms are far more severe. Constant worry, muscle tension, trouble sleeping, nausea and diarrhea, and other symptoms can make normal daily activities difficult or impossible. These symptoms may occur for no reason, and they can affect your work, school, or social life. Medicines, counseling, and self-care can all help. Follow-up care is a key part of your treatment and safety. Be sure to make and go to all appointments, and call your doctor if you are having problems. It's also a good idea to know your test results and keep a list of the medicines you take. How can you care for yourself at home? · Take medicines exactly as directed. Call your doctor if you think you are having a problem with your medicine. · Go to your counseling sessions and follow-up appointments. · Recognize and accept your anxiety. Then, when you are in a situation that makes you anxious, say to yourself, \"This is not an emergency. I feel uncomfortable, but I am not in danger. I can keep going even if I feel anxious. \"  · Be kind to your body:  ? Relieve tension with exercise or a massage. ? Get enough rest.  ? Avoid alcohol, caffeine, nicotine, and illegal drugs. They can increase your anxiety level and cause sleep problems. ? Learn and do relaxation techniques. See below for more about these techniques. · Engage your mind. Get out and do something you enjoy. Go to a funny movie, or take a walk or hike. Plan your day. Having too much or too little to do can make you anxious. · Keep a record of your symptoms.  Discuss your fears with a good friend or family member, or join a support group for people with similar problems. Talking to others sometimes relieves stress. · Get involved in social groups, or volunteer to help others. Being alone sometimes makes things seem worse than they are. · Get at least 30 minutes of exercise on most days of the week to relieve stress. Walking is a good choice. You also may want to do other activities, such as running, swimming, cycling, or playing tennis or team sports. Relaxation techniques  Do relaxation exercises 10 to 20 minutes a day. You can play soothing, relaxing music while you do them, if you wish. · Tell others in your house that you are going to do your relaxation exercises. Ask them not to disturb you. · Find a comfortable place, away from all distractions and noise. · Lie down on your back, or sit with your back straight. · Focus on your breathing. Make it slow and steady. · Breathe in through your nose. Breathe out through either your nose or mouth. · Breathe deeply, filling up the area between your navel and your rib cage. Breathe so that your belly goes up and down. · Do not hold your breath. · Breathe like this for 5 to 10 minutes. Notice the feeling of calmness throughout your whole body. As you continue to breathe slowly and deeply, relax by doing the following for another 5 to 10 minutes:  · Tighten and relax each muscle group in your body. You can begin at your toes and work your way up to your head. · Imagine your muscle groups relaxing and becoming heavy. · Empty your mind of all thoughts. · Let yourself relax more and more deeply. · Become aware of the state of calmness that surrounds you. · When your relaxation time is over, you can bring yourself back to alertness by moving your fingers and toes and then your hands and feet and then stretching and moving your entire body.  Sometimes people fall asleep during relaxation, but they usually wake up shortly afterward. · Always give yourself time to return to full alertness before you drive a car or do anything that might cause an accident if you are not fully alert. Never play a relaxation tape while you drive a car. When should you call for help? Call 911 anytime you think you may need emergency care. For example, call if:    · You feel you cannot stop from hurting yourself or someone else.   Jorge Mccabe the numbers for these national suicide hotlines: 9-585-679-TALK (7-486.466.7314) and 0-926-OGUMKZY (3-482.336.2567). If you or someone you know talks about suicide or feeling hopeless, get help right away.   Watch closely for changes in your health, and be sure to contact your doctor if:    · You have anxiety or fear that affects your life.     · You have symptoms of anxiety that are new or different from those you had before. Where can you learn more? Go to http://bobbi-daniel.info/. Enter P754 in the search box to learn more about \"Anxiety Disorder: Care Instructions. \"  Current as of: September 11, 2018  Content Version: 11.9  © 8962-6583 [employer]. Care instructions adapted under license by [employer] (which disclaims liability or warranty for this information). If you have questions about a medical condition or this instruction, always ask your healthcare professional. [address] any warranty or liability for your use of this information.

## 2020-12-07 ENCOUNTER — OFFICE VISIT (OUTPATIENT)
Dept: FAMILY MEDICINE CLINIC | Age: 25
End: 2020-12-07
Payer: COMMERCIAL

## 2020-12-07 VITALS
TEMPERATURE: 98.4 F | SYSTOLIC BLOOD PRESSURE: 108 MMHG | OXYGEN SATURATION: 100 % | BODY MASS INDEX: 21.63 KG/M2 | WEIGHT: 129.8 LBS | HEART RATE: 84 BPM | RESPIRATION RATE: 14 BRPM | DIASTOLIC BLOOD PRESSURE: 62 MMHG | HEIGHT: 65 IN

## 2020-12-07 DIAGNOSIS — B37.2 CANDIDAL INTERTRIGO: Primary | ICD-10-CM

## 2020-12-07 PROCEDURE — 99213 OFFICE O/P EST LOW 20 MIN: CPT | Performed by: FAMILY MEDICINE

## 2020-12-07 RX ORDER — CLOTRIMAZOLE AND BETAMETHASONE DIPROPIONATE 10; .64 MG/G; MG/G
CREAM TOPICAL
Qty: 30 G | Refills: 2 | Status: SHIPPED | OUTPATIENT
Start: 2020-12-07 | End: 2021-03-01 | Stop reason: ALTCHOICE

## 2020-12-07 RX ORDER — VENLAFAXINE HYDROCHLORIDE 150 MG/1
CAPSULE, EXTENDED RELEASE ORAL DAILY
COMMUNITY

## 2020-12-07 NOTE — PROGRESS NOTES
Sol Wilkerson is a 26 y.o. female (: 1995) presenting to address:    Chief Complaint   Patient presents with    Rash     bilat axila       Vitals:    20 1131   BP: 108/62   Pulse: 84   Resp: 14   Temp: 98.4 °F (36.9 °C)   TempSrc: Temporal   SpO2: 100%   Weight: 129 lb 12.8 oz (58.9 kg)   Height: 5' 5\" (1.651 m)   PainSc:   0 - No pain   LMP: 2020       Hearing/Vision:   No exam data present    Learning Assessment:     Learning Assessment 2017   PRIMARY LEARNER Patient   HIGHEST LEVEL OF EDUCATION - PRIMARY LEARNER  SOME COLLEGE   BARRIERS PRIMARY LEARNER NONE     NONE   PRIMARY LANGUAGE ENGLISH   LEARNER PREFERENCE PRIMARY READING   ANSWERED BY patient   RELATIONSHIP SELF     Depression Screening:     3 most recent PHQ Screens 2018   Little interest or pleasure in doing things Not at all   Feeling down, depressed, irritable, or hopeless Not at all   Total Score PHQ 2 0     Fall Risk Assessment:   No flowsheet data found. Abuse Screening:   No flowsheet data found. Coordination of Care Questionaire:   1. Have you been to the ER, urgent care clinic since your last visit? Hospitalized since your last visit? NO    2. Have you seen or consulted any other health care providers outside of the [address] [place] [address] since your last visit? Include any pap smears or colon screening. YES, psychiatrist    Advanced Directive:   1. Do you have an Advanced Directive? YES    2. Would you like information on Advanced Directives? NO    Patient DECLINED flu vaccine.

## 2020-12-07 NOTE — PROGRESS NOTES
HISTORY OF PRESENT ILLNESS  Michael Neri is a 26 y.o. female. She reports an itchy and subsequently painful rash starting in the left axilla and subsequently developing on the right as well. She denies any change in deodorant, bath soap, laundry detergent, fabric softener or other personal care items. She denies fever or any systemic signs of illness      Review of Systems   Constitutional: Negative for fever. Respiratory: Negative for shortness of breath. Cardiovascular: Negative for chest pain. Gastrointestinal: Negative for abdominal pain. Skin: Positive for itching and rash. Bilateral axillary rash, see HPI       Physical Exam  Vitals signs and nursing note reviewed. Constitutional:       Appearance: She is well-developed. HENT:      Head: Normocephalic. Eyes:      Conjunctiva/sclera: Conjunctivae normal.   Neck:      Musculoskeletal: Neck supple. Cardiovascular:      Rate and Rhythm: Normal rate and regular rhythm. Heart sounds: Normal heart sounds. Pulmonary:      Effort: Pulmonary effort is normal.      Breath sounds: Normal breath sounds. Skin:     General: Skin is warm and dry. Findings: Rash ( Bilateral axillary and erythema) present. Neurological:      Mental Status: She is alert and oriented to person, place, and time. Psychiatric:         Mood and Affect: Mood normal.         Behavior: Behavior normal.         ASSESSMENT and PLAN    ICD-10-CM ICD-9-CM    1.  Candidal intertrigo  B37.2 112.3 clotrimazole-betamethasone (LOTRISONE) topical cream   Lotrisone cream, apply to affected areas twice daily until symptoms resolve  Follow-up for new symptoms, worsening symptoms or failure to improve

## 2020-12-07 NOTE — PATIENT INSTRUCTIONS
Lotrisone cream, apply to affected areas twice daily until symptoms resolve  Follow-up for new symptoms, worsening symptoms or failure to improve

## 2021-02-01 ENCOUNTER — TELEPHONE (OUTPATIENT)
Dept: FAMILY MEDICINE CLINIC | Age: 26
End: 2021-02-01

## 2021-02-01 NOTE — TELEPHONE ENCOUNTER
----- Message from [address] sent at 2/1/2021 11:24 AM EST -----  Regarding: R/S VV TO [facility]C VISIT  PLS CL TO SCHED AN [facility] VISIT

## 2021-02-28 NOTE — PROGRESS NOTES
HISTORY OF PRESENT ILLNESS  Re Noe is a 26 y.o. female. She presents for health assessment, preventative care and follow-up with a history of anxiety and depression, axillary intertrigo and with concern about venous insufficiency. Today she reports that the axillary intertrigo has completely resolved. Her mother has recently had several surgical procedures for treatment of varicosities and venous insufficiency and was apparently told by the consultant that venous insufficiency is heritable. The patient reports that she feels as though her legs are heavy and tired too easily and has noted some veins she can see. Mr#: [id number]      History reviewed. No pertinent past medical history. History reviewed. No pertinent surgical history. 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 , Disp: , Rfl:        Review of Systems   Constitutional: Negative for fever, malaise/fatigue and weight loss. HENT: Negative for congestion, ear pain, hearing loss and sore throat. Wart left 5th finger x 2 months   Eyes: Negative for blurred vision. Respiratory: Negative for cough, shortness of breath and wheezing. Cardiovascular: Negative for chest pain, palpitations, orthopnea and leg swelling.    Gastrointestinal: Negative for abdominal pain, blood in stool, constipation, diarrhea, heartburn, melena, nausea and vomiting. Genitourinary: Negative for dysuria, frequency, hematuria and urgency. Musculoskeletal: Negative for back pain, joint pain and myalgias. Legs feel tired, heavy   Skin: Negative for itching and rash. Wart vulvar left fifth finger   Neurological: Negative for dizziness, tingling, sensory change, focal weakness and headaches. Endo/Heme/Allergies: Negative for environmental allergies. Psychiatric/Behavioral: Negative for depression and suicidal ideas. The patient is not nervous/anxious. Doing well on venlafaxine, followed by psychiatry-does report a recent emergency room evaluation prompted by symptoms of venlafaxine withdrawal when she went out of town and forgot the medication. Visit Vitals  /60 (BP 1 Location: Left upper arm, BP Patient Position: Sitting, BP Cuff Size: Adult)   Pulse (!) 104   Temp 98.2 °F (36.8 °C) (Temporal)   Resp 12   Ht 5' 5\" (1.651 m)   Wt 134 lb 9.6 oz (61.1 kg)   LMP 02/07/2021 (Exact Date)   SpO2 99%   BMI 22.40 kg/m²       Physical Exam  Vitals signs and nursing note reviewed. Constitutional:       General: She is not in acute distress. Appearance: Normal appearance. She is not ill-appearing. HENT:      Head: Normocephalic. Right Ear: Tympanic membrane, ear canal and external ear normal.      Left Ear: Tympanic membrane, ear canal and external ear normal.   Eyes:      Extraocular Movements: Extraocular movements intact. Conjunctiva/sclera: Conjunctivae normal.      Pupils: Pupils are equal, round, and reactive to light. Neck:      Musculoskeletal: Neck supple. Vascular: No carotid bruit. Cardiovascular:      Rate and Rhythm: Normal rate and regular rhythm. Pulses: Normal pulses. Heart sounds: Normal heart sounds.       Comments: Dorsal pedal pulses intact, no lower extremity edema, very few fine superficial spider type veins  Pulmonary:      Effort: Pulmonary effort is normal. Breath sounds: Normal breath sounds. Abdominal:      Palpations: Abdomen is soft. Tenderness: There is no abdominal tenderness. Musculoskeletal:         General: No swelling or deformity. Right lower leg: No edema. Left lower leg: No edema. Skin:     General: Skin is warm and dry. Findings: Lesion ( Flat wart volar middle phalanx left fifth finger) present. Neurological:      Mental Status: She is alert and oriented to person, place, and time. Psychiatric:         Mood and Affect: Mood normal.         Behavior: Behavior normal.         ASSESSMENT and PLAN    ICD-10-CM ICD-9-CM    1. Routine general medical examination at a health care facility  Z00.00 V70.0    Health maintenance:  Hepatitis C screen-with next blood draw  HPV immunization series-current    Advised that venous insufficiency is a very common disorder and that heritable venous insufficiency is estimated about 17% with no actual identification of any gene involved in its transmission. Written information provided about signs and symptoms and usual interventions should these occur. Continue venlafaxine and psychiatry follow-up  Return for annual physical exam follow-up in 1 year or sooner with any problems    Nomi Chanel MD      PLEASE NOTE:   This document has been produced using voice recognition software. Unrecognized errors in transcription may be present.

## 2021-03-01 ENCOUNTER — OFFICE VISIT (OUTPATIENT)
Dept: FAMILY MEDICINE CLINIC | Age: 26
End: 2021-03-01
Payer: COMMERCIAL

## 2021-03-01 VITALS
HEIGHT: 65 IN | RESPIRATION RATE: 12 BRPM | DIASTOLIC BLOOD PRESSURE: 60 MMHG | BODY MASS INDEX: 22.42 KG/M2 | OXYGEN SATURATION: 99 % | WEIGHT: 134.6 LBS | TEMPERATURE: 98.2 F | SYSTOLIC BLOOD PRESSURE: 108 MMHG | HEART RATE: 104 BPM

## 2021-03-01 DIAGNOSIS — Z00.00 ROUTINE GENERAL MEDICAL EXAMINATION AT A HEALTH CARE FACILITY: Primary | ICD-10-CM

## 2021-03-01 PROCEDURE — 99395 PREV VISIT EST AGE 18-39: CPT | Performed by: FAMILY MEDICINE

## 2021-03-01 NOTE — PROGRESS NOTES
Felix Mojica is a 26 y.o. female (: 1995) presenting to address:    Chief Complaint   Patient presents with    Follow-up     dicuss gentic issue        Vitals:    21 1002   BP: 108/60   Pulse: (!) 104   Resp: 12   Temp: 98.2 °F (36.8 °C)   TempSrc: Temporal   SpO2: 99%   Weight: 134 lb 9.6 oz (61.1 kg)   Height: 5' 5\" (1.651 m)   PainSc:   0 - No pain   LMP: 2021       Hearing/Vision:   No exam data present    Learning Assessment:     Learning Assessment 2017   PRIMARY LEARNER Patient   HIGHEST LEVEL OF EDUCATION - PRIMARY LEARNER  SOME COLLEGE   BARRIERS PRIMARY LEARNER NONE     NONE   PRIMARY LANGUAGE ENGLISH   LEARNER PREFERENCE PRIMARY READING   ANSWERED BY patient   RELATIONSHIP SELF     Depression Screening:     3 most recent PHQ Screens 2018   Little interest or pleasure in doing things Not at all   Feeling down, depressed, irritable, or hopeless Not at all   Total Score PHQ 2 0     Fall Risk Assessment:     Fall Risk Assessment, last 12 mths 3/1/2021   Able to walk? Yes   Fall in past 12 months? 0   Do you feel unsteady? 0   Are you worried about falling 0     Abuse Screening:   No flowsheet data found. Coordination of Care Questionaire:   1. Have you been to the ER, urgent care clinic since your last visit? Hospitalized since your last visit? YES    2. Have you seen or consulted any other health care providers outside of the [address] [place] [address] since your last visit? Include any pap smears or colon screening. NO    Advanced Directive:   1. Do you have an Advanced Directive? YES    2. Would you like information on Advanced Directives? NO      Patient DECLINED flu vaccine.

## 2021-03-01 NOTE — PATIENT INSTRUCTIONS
Continue venlafaxine and psychiatry follow-up  Return for annual physical exam follow-up in 1 year or sooner with any problems

## 2021-04-06 ENCOUNTER — OFFICE VISIT (OUTPATIENT)
Dept: FAMILY MEDICINE CLINIC | Age: 26
End: 2021-04-06
Payer: COMMERCIAL

## 2021-04-06 VITALS
OXYGEN SATURATION: 98 % | DIASTOLIC BLOOD PRESSURE: 60 MMHG | TEMPERATURE: 98.4 F | HEART RATE: 105 BPM | BODY MASS INDEX: 22.76 KG/M2 | RESPIRATION RATE: 13 BRPM | HEIGHT: 65 IN | SYSTOLIC BLOOD PRESSURE: 100 MMHG | WEIGHT: 136.6 LBS

## 2021-04-06 DIAGNOSIS — L23.9 ALLERGIC DERMATITIS: Primary | ICD-10-CM

## 2021-04-06 PROCEDURE — 99213 OFFICE O/P EST LOW 20 MIN: CPT | Performed by: FAMILY MEDICINE

## 2021-04-06 RX ORDER — TRIAMCINOLONE ACETONIDE 1 MG/G
CREAM TOPICAL 2 TIMES DAILY
Qty: 30 G | Refills: 1 | Status: SHIPPED | OUTPATIENT
Start: 2021-04-06 | End: 2021-08-02

## 2021-04-06 RX ORDER — HYDROXYZINE PAMOATE 25 MG/1
25 CAPSULE ORAL
COMMUNITY
End: 2022-05-17

## 2021-04-06 NOTE — PROGRESS NOTES
HISTORY OF PRESENT ILLNESS  Jeronimo Cavanaugh is a 26 y.o. female. She reports breaking out in [facility]" over the past few days. She reports an intensely pruritic eruption around her anterior neck with minimal extension onto the chest and shoulders. She acknowledges some mild upper respiratory allergy type symptoms with minimal throat discomfort but has not experienced fever or felt ill. She indicates that she has not worn any jewelry in this area, and has not changed the best soap, fabric softener or any other personal care items. She has not worn any new clothes before they were laundry at the first time. He denies any systemic signs of allergic reaction such as thick tongue, difficulty swallowing or wheezing. He reports that she has tried applying OTC cortisone cream without any significant relief. 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 , Disp: , Rfl:     triamcinolone acetonide (KENALOG) 0.1 % topical cream, Apply  to affected area two (2) times a day. use thin layer, Disp: 30 g, Rfl: 1    venlafaxine-SR (EFFEXOR-XR) 150 mg capsule, Take  by mouth daily. , Disp: , Rfl:      Allergies   Allergen Reactions    Sulfa (Sulfonamide Antibiotics) Swelling       Review of Systems   Constitutional: Negative for chills and fever. HENT: Positive for sore throat ( Minimal discomfort off-and-on). Negative for congestion and ear pain. Respiratory: Negative for cough and wheezing. Gastrointestinal: Negative for abdominal pain, diarrhea, nausea and vomiting. Skin: Positive for itching and rash. Neurological: Negative for dizziness and headaches. Endo/Heme/Allergies: Negative for environmental allergies.      Visit Vitals  /60 (BP 1 Location: Left upper arm, BP Patient Position: Sitting, BP Cuff Size: Adult)   Pulse (!) 105   Temp 98.4 °F (36.9 °C) (Temporal)   Resp 13   Ht 5' 5\" (1.651 m)   Wt 136 lb 9.6 oz (62 kg)   LMP 04/01/2021 (Exact Date)   SpO2 98%   BMI 22.73 kg/m²       Physical Exam  Vitals signs and nursing note reviewed. Constitutional:       Appearance: She is well-developed. HENT:      Head: Normocephalic. Right Ear: Tympanic membrane, ear canal and external ear normal.      Left Ear: Tympanic membrane, ear canal and external ear normal.      Mouth/Throat:      Mouth: Mucous membranes are moist.      Pharynx: Oropharynx is clear. No oropharyngeal exudate or posterior oropharyngeal erythema. Eyes:      Extraocular Movements: Extraocular movements intact. Conjunctiva/sclera: Conjunctivae normal.   Neck:      Musculoskeletal: Neck supple. Cardiovascular:      Rate and Rhythm: Normal rate and regular rhythm. Heart sounds: Normal heart sounds. Pulmonary:      Effort: Pulmonary effort is normal.      Breath sounds: Normal breath sounds. Lymphadenopathy:      Cervical: No cervical adenopathy. Skin:     General: Skin is warm and dry. Findings: Rash ( Pink papular eruption, no tender areas around the anterior neck) present. Neurological:      Mental Status: She is alert and oriented to person, place, and time. Psychiatric:         Mood and Affect: Mood normal.         Behavior: Behavior normal.         Thought Content: Thought content normal.         ASSESSMENT and PLAN    ICD-10-CM ICD-9-CM    1.  Allergic dermatitis  L23.9 692.9 triamcinolone acetonide (KENALOG) 0.1 % topical cream   Avoid hot baths and showers  Apply cool compresses  Triamcinolone 0.1% cream to be applied twice daily while symptoms persist  Hydroxyzine up to 50 mg if needed for itching  Follow-up for new symptoms, worsening symptoms or failure to improve    Beka Bray MD      PLEASE NOTE:   This document has been produced using voice recognition software. Unrecognized errors in transcription may be present.

## 2021-04-06 NOTE — PATIENT INSTRUCTIONS
Avoid hot baths and showers  Apply cool compresses  Triamcinolone 0.1% cream to be applied twice daily while symptoms persist  Hydroxyzine up to 50 mg if needed for itching  Follow-up for new symptoms, worsening symptoms or failure to improve

## 2021-04-06 NOTE — PROGRESS NOTES
Jc Webber is a 26 y.o. female (: 1995) presenting to address:    Chief Complaint   Patient presents with   Colton Whitfield     w/sore throat, runny nose    Sore Throat       Vitals:    21 1335   BP: 100/60   Pulse: (!) 105   Resp: 13   Temp: 98.4 °F (36.9 °C)   TempSrc: Temporal   SpO2: 98%   Weight: 136 lb 9.6 oz (62 kg)   Height: 5' 5\" (1.651 m)   PainSc:   0 - No pain   LMP: 2021       Hearing/Vision:   No exam data present    Learning Assessment:     Learning Assessment 2017   PRIMARY LEARNER Patient   HIGHEST LEVEL OF EDUCATION - PRIMARY LEARNER  SOME COLLEGE   BARRIERS PRIMARY LEARNER NONE     NONE   PRIMARY LANGUAGE ENGLISH   LEARNER PREFERENCE PRIMARY READING   ANSWERED BY patient   RELATIONSHIP SELF     Depression Screening:     3 most recent PHQ Screens 2018   Little interest or pleasure in doing things Not at all   Feeling down, depressed, irritable, or hopeless Not at all   Total Score PHQ 2 0     Fall Risk Assessment:     Fall Risk Assessment, last 12 mths 3/1/2021   Able to walk? Yes   Fall in past 12 months? 0   Do you feel unsteady? 0   Are you worried about falling 0     Abuse Screening:   No flowsheet data found. Coordination of Care Questionaire:   1. Have you been to the ER, urgent care clinic since your last visit? Hospitalized since your last visit? NO    2. Have you seen or consulted any other health care providers outside of the [address] [place] [address] since your last visit? Include any pap smears or colon screening. YES, psychiatrist    Advanced Directive:   1. Do you have an Advanced Directive? YES    2. Would you like information on Advanced Directives?  NO

## 2021-07-12 ENCOUNTER — OFFICE VISIT (OUTPATIENT)
Dept: FAMILY MEDICINE CLINIC | Age: 26
End: 2021-07-12
Payer: COMMERCIAL

## 2021-07-12 ENCOUNTER — APPOINTMENT (OUTPATIENT)
Dept: FAMILY MEDICINE CLINIC | Age: 26
End: 2021-07-12

## 2021-07-12 VITALS
HEART RATE: 81 BPM | DIASTOLIC BLOOD PRESSURE: 74 MMHG | HEIGHT: 65 IN | WEIGHT: 135 LBS | TEMPERATURE: 98.1 F | OXYGEN SATURATION: 98 % | RESPIRATION RATE: 16 BRPM | SYSTOLIC BLOOD PRESSURE: 113 MMHG | BODY MASS INDEX: 22.49 KG/M2

## 2021-07-12 DIAGNOSIS — R35.0 URINARY FREQUENCY: ICD-10-CM

## 2021-07-12 DIAGNOSIS — M79.10 MYALGIA: ICD-10-CM

## 2021-07-12 DIAGNOSIS — E28.2 PCOS (POLYCYSTIC OVARIAN SYNDROME): ICD-10-CM

## 2021-07-12 DIAGNOSIS — E28.2 PCOS (POLYCYSTIC OVARIAN SYNDROME): Primary | ICD-10-CM

## 2021-07-12 DIAGNOSIS — R53.83 FATIGUE, UNSPECIFIED TYPE: ICD-10-CM

## 2021-07-12 DIAGNOSIS — N39.0 URINARY TRACT INFECTION WITHOUT HEMATURIA, SITE UNSPECIFIED: ICD-10-CM

## 2021-07-12 LAB
BILIRUB UR QL STRIP: NEGATIVE
GLUCOSE UR-MCNC: NEGATIVE MG/DL
KETONES P FAST UR STRIP-MCNC: NEGATIVE MG/DL
PH UR STRIP: 7 [PH] (ref 4.6–8)
PROT UR QL STRIP: NEGATIVE
SP GR UR STRIP: 1.02 (ref 1–1.03)
UA UROBILINOGEN AMB POC: NORMAL (ref 0.2–1)
URINALYSIS CLARITY POC: CLEAR
URINALYSIS COLOR POC: YELLOW
URINE BLOOD POC: NEGATIVE
URINE LEUKOCYTES POC: NORMAL
URINE NITRITES POC: NEGATIVE

## 2021-07-12 PROCEDURE — 99213 OFFICE O/P EST LOW 20 MIN: CPT | Performed by: NURSE PRACTITIONER

## 2021-07-12 PROCEDURE — 81003 URINALYSIS AUTO W/O SCOPE: CPT | Performed by: NURSE PRACTITIONER

## 2021-07-12 RX ORDER — NITROFURANTOIN 25; 75 MG/1; MG/1
100 CAPSULE ORAL 2 TIMES DAILY
Qty: 14 CAPSULE | Refills: 0 | Status: SHIPPED | OUTPATIENT
Start: 2021-07-12 | End: 2021-07-19

## 2021-07-12 RX ORDER — ETONOGESTREL AND ETHINYL ESTRADIOL 11.7; 2.7 MG/1; MG/1
INSERT, EXTENDED RELEASE VAGINAL
COMMUNITY
End: 2022-10-31

## 2021-07-12 NOTE — PROGRESS NOTES
JACKIE Groves is a 26y.o. year old female who presents today with urinary frequency for a few weeks. Not having any burning or urinary urgency. Feels like bladder is full and can go to the bathroom right after she uses it. No lower back pain or abdominal pain. No nausea, vomiting, diarrhea. Also has issues with chronic fatigue, joint pains and \"brain fog\" for years. Says she has mentioned it to her PCP before but she has not really followed up on the issues. Has not had any recent labs done. No past medical history on file. No past surgical history on file. Social History     Tobacco Use    Smoking status: Never Smoker    Smokeless tobacco: Never Used   Substance Use Topics    Alcohol use: Yes     Comment: occasional     Drug use: Not on file         Current Outpatient Medications:     ethinyl estradiol-etonogestrel (NuvaRing) 0.12-0.015 mg/24 hr vaginal ring, by Intravaginal route., Disp: , Rfl:     hydrOXYzine pamoate (VISTARIL) 25 mg capsule, Take 25 mg by mouth daily as needed. , Disp: , Rfl:     venlafaxine-SR (EFFEXOR-XR) 150 mg capsule, Take  by mouth daily. , Disp: , Rfl:     triamcinolone acetonide (KENALOG) 0.1 % topical cream, Apply  to affected area two (2) times a day. use thin layer (Patient not taking: Reported on 7/12/2021), Disp: 30 g, Rfl: 1     Allergies   Allergen Reactions    Sulfa (Sulfonamide Antibiotics) Swelling       Review of Systems   Constitutional: Positive for malaise/fatigue. Negative for chills and fever. Respiratory: Negative for cough and shortness of breath. Cardiovascular: Negative for chest pain. Gastrointestinal: Negative for abdominal pain, diarrhea, nausea and vomiting. Genitourinary: Positive for frequency. Negative for dysuria, flank pain, hematuria and urgency. Musculoskeletal: Positive for joint pain (diffuse, chronic) and myalgias. Neurological: Negative for dizziness and headaches.    Endo/Heme/Allergies: Negative for polydipsia. PE  /74   Pulse 81   Temp 98.1 °F (36.7 °C) (Temporal)   Resp 16   Ht 5' 5\" (1.651 m)   Wt 135 lb (61.2 kg)   LMP 06/24/2021   SpO2 98%   BMI 22.47 kg/m²     Physical Exam  Vitals reviewed. Constitutional:       General: She is not in acute distress. Appearance: Normal appearance. HENT:      Head: Normocephalic and atraumatic. Neck:      Thyroid: No thyroid mass, thyromegaly or thyroid tenderness. Cardiovascular:      Rate and Rhythm: Normal rate and regular rhythm. Heart sounds: S1 normal and S2 normal. No murmur heard. No friction rub. No gallop. No S3 or S4 sounds. Pulmonary:      Effort: Pulmonary effort is normal.      Breath sounds: Normal breath sounds. No wheezing, rhonchi or rales. Abdominal:      General: Bowel sounds are normal. There is no distension. Palpations: Abdomen is soft. There is no hepatomegaly or splenomegaly. Tenderness: There is no abdominal tenderness. There is no right CVA tenderness, left CVA tenderness, guarding or rebound. Musculoskeletal:      Right lower leg: No edema. Left lower leg: No edema. Lymphadenopathy:      Cervical: No cervical adenopathy. Skin:     General: Skin is warm and dry. Capillary Refill: Capillary refill takes less than 2 seconds. Neurological:      Mental Status: She is alert and oriented to person, place, and time. [id number]/Plan  1. UTI  macrobid 100 BID X7 days  Urine culture  Push PO fluids  FU symptoms worsen or do not improve    2.  Myalgia, fatigue  CMP, CBC, TSH, A1C  FU with PCP for further evaluation

## 2021-07-12 NOTE — PROGRESS NOTES
Jemma Vivar is a 26 y.o. female (: 1995) presenting to address:    Chief Complaint   Patient presents with    Fatigue     chronic for a few years     Joint Pain      muscle fatigue a year     Urinary Frequency     a few weeks ago        Vitals:    21 0812   BP: 113/74   Pulse: 81   Resp: 16   Temp: 98.1 °F (36.7 °C)   TempSrc: Temporal   SpO2: 98%   Weight: 135 lb (61.2 kg)   Height: 5' 5\" (1.651 m)   PainSc:   3   PainLoc: Generalized   LMP: 2021       Hearing/Vision:   No exam data present    Learning Assessment:     Learning Assessment 2017   PRIMARY LEARNER Patient   HIGHEST LEVEL OF EDUCATION - PRIMARY LEARNER  SOME COLLEGE   BARRIERS PRIMARY LEARNER NONE     NONE   PRIMARY LANGUAGE ENGLISH   LEARNER PREFERENCE PRIMARY READING   ANSWERED BY patient   RELATIONSHIP SELF     Depression Screening:     3 most recent PHQ Screens 2018   Little interest or pleasure in doing things Not at all   Feeling down, depressed, irritable, or hopeless Not at all   Total Score PHQ 2 0     Fall Risk Assessment:     Fall Risk Assessment, last 12 mths 3/1/2021   Able to walk? Yes   Fall in past 12 months? 0   Do you feel unsteady? 0   Are you worried about falling 0     Abuse Screening:     Abuse Screening Questionnaire 2021   Do you ever feel afraid of your partner? N   Are you in a relationship with someone who physically or mentally threatens you? N   Is it safe for you to go home? Y     Coordination of Care Questionaire:   1. Have you been to the ER, urgent care clinic since your last visit? Hospitalized since your last visit? NO    2. Have you seen or consulted any other health care providers outside of the [address] [place] [address] since your last visit? Include any pap smears or colon screening. NO    Advanced Directive:   1. Do you have an Advanced Directive? NO    2. Would you like information on Advanced Directives?  NO

## 2021-07-13 LAB
A-G RATIO,AGRAT: 2 RATIO (ref 1.1–2.6)
ABSOLUTE LYMPHOCYTE COUNT, 10803: 1.2 K/UL (ref 1–4.8)
ALBUMIN SERPL-MCNC: 4.4 G/DL (ref 3.5–5)
ALP SERPL-CCNC: 62 U/L (ref 25–115)
ALT SERPL-CCNC: 8 U/L (ref 5–40)
ANION GAP SERPL CALC-SCNC: 12 MMOL/L (ref 3–15)
AST SERPL W P-5'-P-CCNC: 10 U/L (ref 10–37)
AVG GLU, 10930: 97 MG/DL (ref 91–123)
BASOPHILS # BLD: 0 K/UL (ref 0–0.2)
BASOPHILS NFR BLD: 1 % (ref 0–2)
BILIRUB SERPL-MCNC: 0.2 MG/DL (ref 0.2–1.2)
BUN SERPL-MCNC: 8 MG/DL (ref 6–22)
CALCIUM SERPL-MCNC: 9.9 MG/DL (ref 8.4–10.5)
CHLORIDE SERPL-SCNC: 103 MMOL/L (ref 98–110)
CO2 SERPL-SCNC: 25 MMOL/L (ref 20–32)
CREAT SERPL-MCNC: 0.7 MG/DL (ref 0.5–1.2)
EOSINOPHIL # BLD: 0 K/UL (ref 0–0.5)
EOSINOPHIL NFR BLD: 1 % (ref 0–6)
ERYTHROCYTE [DISTWIDTH] IN BLOOD BY AUTOMATED COUNT: 12.6 % (ref 10–15.5)
GFRAA, 66117: >60
GFRNA, 66118: >60
GLOBULIN,GLOB: 2.2 G/DL (ref 2–4)
GLUCOSE SERPL-MCNC: 69 MG/DL (ref 70–99)
GRANULOCYTES,GRANS: 64 % (ref 40–75)
HBA1C MFR BLD HPLC: 5 % (ref 4.8–5.6)
HCT VFR BLD AUTO: 45 % (ref 35.1–46.5)
HGB BLD-MCNC: 13.7 G/DL (ref 11.7–15.5)
LYMPHOCYTES, LYMLT: 27 % (ref 20–45)
MCH RBC QN AUTO: 30 PG (ref 26–34)
MCHC RBC AUTO-ENTMCNC: 30 G/DL (ref 31–36)
MCV RBC AUTO: 99 FL (ref 81–99)
MONOCYTES # BLD: 0.3 K/UL (ref 0.1–1)
MONOCYTES NFR BLD: 8 % (ref 3–12)
NEUTROPHILS # BLD AUTO: 2.8 K/UL (ref 1.8–7.7)
PLATELET # BLD AUTO: 242 K/UL (ref 140–440)
PMV BLD AUTO: 10.9 FL (ref 9–13)
POTASSIUM SERPL-SCNC: 4.4 MMOL/L (ref 3.5–5.5)
PROT SERPL-MCNC: 6.6 G/DL (ref 6.4–8.3)
RBC # BLD AUTO: 4.55 M/UL (ref 3.8–5.2)
SODIUM SERPL-SCNC: 140 MMOL/L (ref 133–145)
TSH SERPL DL<=0.005 MIU/L-ACNC: 1.6 MCU/ML (ref 0.27–4.2)
WBC # BLD AUTO: 4.4 K/UL (ref 4–11)

## 2021-07-14 LAB — RESULT: NORMAL

## 2021-08-01 NOTE — PROGRESS NOTES
HISTORY OF PRESENT ILLNESS  Rubin Stinson is a 23 y.o. female. Ms. Donovan Melgar returns for follow-up after recent evaluation with MIRTHA Plummer at which time she reported UTI symptoms and was successfully treated with Macrobid. She also reported [place] fog\". Lab studies were obtained and were essentially unremarkable. She has a history of depression with anxiety followed by psychiatry. She has also previously reported symptoms consistent with reactive hypoglycemia. Today she reports that the brain fog symptoms seem to have resolved however she continues to be bothered by joint pain involving multiple joints and a burning sensation in her muscles. She is concerned about thigh muscle weakness and the fact that she becomes short of breath too easily with exertion. She also reports that she is chronically fatigued and awakens in the morning not feeling refreshed from sleep. She reports that she has not been advised that she snores or observed apnea episodes but does report that she talks in her sleep and \"cannot hold still\". She feels that anxiety/depression symptoms are well controlled on her current regimen of venlafaxine  mg daily prescribed by her psychiatrist.      Review of Systems   Constitutional: Positive for malaise/fatigue. Negative for fever and weight loss. Respiratory: Positive for shortness of breath ( with exertion). Cardiovascular: Negative for chest pain and palpitations. Gastrointestinal: Negative for abdominal pain, diarrhea, heartburn, nausea and vomiting. Genitourinary: Negative for dysuria, flank pain, frequency, hematuria and urgency. Musculoskeletal: Positive for joint pain (fingers, knees, ankles). Neurological: Positive for dizziness. Negative for tingling and headaches. \"Brain fog\"   Endo/Heme/Allergies:        Frequent hypoglycemia symptoms, previously thought to be consistent with reactive hypoglycemia   Psychiatric/Behavioral: Positive for depression. Negative for suicidal ideas. The patient is nervous/anxious. Visit Vitals  /68 (BP 1 Location: Left upper arm, BP Patient Position: Sitting, BP Cuff Size: Adult)   Pulse 92   Temp 98.1 °F (36.7 °C) (Temporal)   Resp 16   Ht 5' 5\" (1.651 m)   Wt 135 lb 3.2 oz (61.3 kg)   LMP 07/20/2021 (Exact Date)   SpO2 97%   BMI 22.50 kg/m²       Physical Exam  Vitals and nursing note reviewed. Constitutional:       General: She is not in acute distress. Appearance: Normal appearance. She is well-developed and normal weight. She is not ill-appearing. HENT:      Head: Normocephalic. Eyes:      Extraocular Movements: Extraocular movements intact. Cardiovascular:      Rate and Rhythm: Normal rate and regular rhythm. Heart sounds: Normal heart sounds. Pulmonary:      Effort: Pulmonary effort is normal.      Breath sounds: Normal breath sounds. Musculoskeletal:      Cervical back: Neck supple. Right lower leg: No edema. Left lower leg: No edema. Comments: Upper and lower proximal and distal extremity muscle strength symmetrical and intact    No joints noted with redness, tenderness or synovial thickening   Skin:     General: Skin is warm and dry. Neurological:      Mental Status: She is alert and oriented to person, place, and time. Psychiatric:         Behavior: Behavior normal.         ASSESSMENT and PLAN    ICD-10-CM ICD-9-CM    1. Chronic pain of multiple joints  M25.50 719.49 GRACE COMPREHENSIVE PANEL    G89.29 338.29 SED RATE (ESR)   2. Myalgia  M79.10 729.1 CK   3. Fatigue, unspecified type  R53.83 780.79 SLEEP MEDICINE REFERRAL   4. Sleep disturbance  G47.9 780.50 SLEEP MEDICINE REFERRAL   5. Depression with anxiety  F41.8 300.4    6.  Reactive hypoglycemia  E16.1 251.2    Assessment:  Unexplained joint and muscle pain  Fatigue, possibly secondary to sleep disturbance  Depression with anxiety reportedly responding well to current treatment      Health maintenance:  COVID-19 immunization status-scheduled  Hepatitis C screening due with next lab draw    Plan:  Lab studies ordered, further disposition pending lab results if indicated  Remember to eat frequent small high-protein/complex carbohydrate meals through the day to avoid symptoms of reactive hypoglycemia  Sleep medicine referral  Follow-up for new symptoms, worsening symptoms or failure to improve    Kristal Head MD      PLEASE NOTE:   This document has been produced using voice recognition software. Unrecognized errors in transcription may be present.

## 2021-08-02 ENCOUNTER — OFFICE VISIT (OUTPATIENT)
Dept: FAMILY MEDICINE CLINIC | Age: 26
End: 2021-08-02
Payer: COMMERCIAL

## 2021-08-02 ENCOUNTER — APPOINTMENT (OUTPATIENT)
Dept: FAMILY MEDICINE CLINIC | Age: 26
End: 2021-08-02

## 2021-08-02 VITALS
SYSTOLIC BLOOD PRESSURE: 102 MMHG | HEART RATE: 92 BPM | DIASTOLIC BLOOD PRESSURE: 68 MMHG | RESPIRATION RATE: 16 BRPM | HEIGHT: 65 IN | WEIGHT: 135.2 LBS | TEMPERATURE: 98.1 F | BODY MASS INDEX: 22.53 KG/M2 | OXYGEN SATURATION: 97 %

## 2021-08-02 DIAGNOSIS — G47.9 SLEEP DISTURBANCE: ICD-10-CM

## 2021-08-02 DIAGNOSIS — Z11.59 NEED FOR HEPATITIS C SCREENING TEST: ICD-10-CM

## 2021-08-02 DIAGNOSIS — M25.50 CHRONIC PAIN OF MULTIPLE JOINTS: Primary | ICD-10-CM

## 2021-08-02 DIAGNOSIS — E16.1 REACTIVE HYPOGLYCEMIA: ICD-10-CM

## 2021-08-02 DIAGNOSIS — M79.10 MYALGIA: ICD-10-CM

## 2021-08-02 DIAGNOSIS — G89.29 CHRONIC PAIN OF MULTIPLE JOINTS: Primary | ICD-10-CM

## 2021-08-02 DIAGNOSIS — R53.83 FATIGUE, UNSPECIFIED TYPE: ICD-10-CM

## 2021-08-02 DIAGNOSIS — M25.50 CHRONIC PAIN OF MULTIPLE JOINTS: ICD-10-CM

## 2021-08-02 DIAGNOSIS — F41.8 DEPRESSION WITH ANXIETY: ICD-10-CM

## 2021-08-02 DIAGNOSIS — G89.29 CHRONIC PAIN OF MULTIPLE JOINTS: ICD-10-CM

## 2021-08-02 PROCEDURE — 99214 OFFICE O/P EST MOD 30 MIN: CPT | Performed by: FAMILY MEDICINE

## 2021-08-02 NOTE — PATIENT INSTRUCTIONS
Lab studies ordered, further disposition pending lab results if indicated  Remember to eat frequent small high-protein/complex carbohydrate meals through the day to avoid symptoms of reactive hypoglycemia  Sleep medicine referral  Follow-up for new symptoms, worsening symptoms or failure to improve

## 2021-08-02 NOTE — PROGRESS NOTES
Halle Gomez presents today for   Chief Complaint   Patient presents with    Follow-up     Muscle pain    Fatigue     Visit Vitals  /68 (BP 1 Location: Left upper arm, BP Patient Position: Sitting, BP Cuff Size: Adult)   Pulse 92   Temp 98.1 °F (36.7 °C) (Temporal)   Resp 16   Ht 5' 5\" (1.651 m)   Wt 135 lb 3.2 oz (61.3 kg)   SpO2 97%   BMI 22.50 kg/m²         Is someone accompanying this pt? no    Is the patient using any DME equipment during OV? no    Depression Screening:  3 most recent PHQ Screens 7/12/2021   Little interest or pleasure in doing things Not at all   Feeling down, depressed, irritable, or hopeless Not at all   Total Score PHQ 2 0   Trouble falling or staying asleep, or sleeping too much More than half the days   Feeling tired or having little energy Nearly every day   Poor appetite, weight loss, or overeating Not at all   Feeling bad about yourself - or that you are a failure or have let yourself or your family down Not at all   Trouble concentrating on things such as school, work, reading, or watching TV Not at all   Moving or speaking so slowly that other people could have noticed; or the opposite being so fidgety that others notice Not at all   Thoughts of being better off dead, or hurting yourself in some way Not at all   PHQ 9 Score 5   How difficult have these problems made it for you to do your work, take care of your home and get along with others Somewhat difficult       Learning Assessment:  Learning Assessment 11/9/2017   PRIMARY LEARNER Patient   HIGHEST LEVEL OF EDUCATION - PRIMARY LEARNER  SOME COLLEGE   BARRIERS PRIMARY LEARNER NONE     NONE   PRIMARY LANGUAGE ENGLISH   LEARNER PREFERENCE PRIMARY READING   ANSWERED BY patient   RELATIONSHIP SELF       Travel Screening:    Travel Screening     Question   Response    In the last month, have you been in contact with someone who was confirmed or suspected to have Coronavirus / COVID-19?   No / Unsure    Have you had a COVID-19 viral test in the last 14 days? No    Do you have any of the following new or worsening symptoms? Joint pain;Muscle pain; Fatigue;Weakness (Not related to COVID ongoing)    Have you traveled internationally or domestically in the last month? No      Travel History   Travel since 07/02/21     No documented travel since 07/02/21          Health Maintenance reviewed and discussed and ordered per Provider. Health Maintenance Due   Topic Date Due    Hepatitis C Screening  Never done    COVID-19 Vaccine (1) Never done   . Coordination of Care:  1. Have you been to the ER, urgent care clinic since your last visit? Hospitalized since your last visit? no    2. Have you seen or consulted any other health care providers outside of the [address] [place] [address] since your last visit? Include any pap smears or colon screening.  no

## 2021-08-03 LAB
ANTI-DNA (DS) AB QN, 1189: <1 IU/ML
CENTROMERE B ANTIBODY, 601143: <0.2 AI
CHROMATIN ANTIBODY: <0.2 AI
CK SERPL-CCNC: 74 U/L (ref 30–165)
ENA SS-A AB SER-ACNC: <0.2 AI
ENA SS-B AB SER-ACNC: <0.2 AI
HCV AB SER IA-ACNC: NORMAL
JO1 ANTIBODY, 8107: <0.2 AI
RNP ABS, 016354: 0.3 AI
SCLERODERMA AB (SCL-70), 601116: <0.2 AI
SED RATE (ESR): 13 MM/HR (ref 0–20)
SMITH ABS, 016362: <0.2 AI

## 2021-08-04 NOTE — PROGRESS NOTES
Please advise that lab results show no evidence of an underlying connective tissue/autoimmune process, general inflammatory process or muscle inflammation cervical screws, hardware

## 2021-08-05 ENCOUNTER — TELEPHONE (OUTPATIENT)
Dept: FAMILY MEDICINE CLINIC | Age: 26
End: 2021-08-05

## 2021-08-05 DIAGNOSIS — M79.10 MYALGIA: ICD-10-CM

## 2021-08-05 DIAGNOSIS — R53.83 FATIGUE, UNSPECIFIED TYPE: Primary | ICD-10-CM

## 2021-08-05 NOTE — TELEPHONE ENCOUNTER
We can check a vitamin B12 and vitamin D level but I am not aware of a general nutrition panel. Let me know if she would like to have those tests and I will put in orders. In addition it will be helpful to know what the sleep evaluation determines.

## 2021-08-05 NOTE — TELEPHONE ENCOUNTER
Spoke w/ pt and she would like to have labs completed.  Pt is scheduled for the following:   Future Appointments   Date Time Provider Azeem Erendira   8/9/2021 10:30 AM [facility]

## 2021-08-05 NOTE — TELEPHONE ENCOUNTER
Pt called and stated that although her test results was normal, she feels that she need a \" nutrition panel\" or labs that would check in that area if possible, because she feels that something else is wrong.

## 2021-08-09 ENCOUNTER — APPOINTMENT (OUTPATIENT)
Dept: FAMILY MEDICINE CLINIC | Age: 26
End: 2021-08-09

## 2021-08-09 DIAGNOSIS — M79.10 MYALGIA: ICD-10-CM

## 2021-08-09 DIAGNOSIS — R53.83 FATIGUE, UNSPECIFIED TYPE: ICD-10-CM

## 2021-08-10 LAB
25(OH)D3 SERPL-MCNC: 38.1 NG/ML (ref 32–100)
VIT B12 SERPL-MCNC: 214 PG/ML (ref 211–911)

## 2022-05-17 ENCOUNTER — OFFICE VISIT (OUTPATIENT)
Dept: FAMILY MEDICINE CLINIC | Age: 27
End: 2022-05-17
Payer: COMMERCIAL

## 2022-05-17 VITALS
HEART RATE: 90 BPM | SYSTOLIC BLOOD PRESSURE: 110 MMHG | WEIGHT: 146 LBS | OXYGEN SATURATION: 98 % | BODY MASS INDEX: 24.32 KG/M2 | RESPIRATION RATE: 16 BRPM | DIASTOLIC BLOOD PRESSURE: 60 MMHG | TEMPERATURE: 98.3 F | HEIGHT: 65 IN

## 2022-05-17 DIAGNOSIS — Z00.00 ROUTINE GENERAL MEDICAL EXAMINATION AT A HEALTH CARE FACILITY: Primary | ICD-10-CM

## 2022-05-17 DIAGNOSIS — G43.009 MIGRAINE WITHOUT AURA AND WITHOUT STATUS MIGRAINOSUS, NOT INTRACTABLE: ICD-10-CM

## 2022-05-17 PROCEDURE — 99395 PREV VISIT EST AGE 18-39: CPT | Performed by: FAMILY MEDICINE

## 2022-05-17 RX ORDER — RIZATRIPTAN BENZOATE 10 MG/1
TABLET, ORALLY DISINTEGRATING ORAL
Qty: 15 TABLET | Refills: 5 | Status: SHIPPED | OUTPATIENT
Start: 2022-05-17

## 2022-05-17 RX ORDER — VENLAFAXINE HYDROCHLORIDE 37.5 MG/1
CAPSULE, EXTENDED RELEASE ORAL DAILY
COMMUNITY

## 2022-05-17 RX ORDER — MIRTAZAPINE 15 MG/1
7.5 TABLET, FILM COATED ORAL
COMMUNITY
End: 2022-10-21

## 2022-05-17 NOTE — PATIENT INSTRUCTIONS
Assessment:  Migraine headaches    Health maintenance recommendations:  COVID-19 immunization booster  HPV immunization series-completed    Plan  Begin rizatriptan 10 mg Dissolve 1 tablet in mouth at first sign of headache, may repeat dose in 2 hours if needed, do not exceed two tablets in 24 hours  Return for annual physical exam in 1 year or sooner with any problems

## 2022-05-17 NOTE — PROGRESS NOTES
HISTORY OF PRESENT ILLNESS  Misha Mcnulty is a 23 y.o. female. She reports concern about headaches and is due for health assessment, preventative care. She has a history of migraine headaches which have become relatively infrequent however recently she has started to have headaches with weather changes and this has occurred quite frequently with frequent thunderstorms. She describes unilateral headaches with photophobia. She reports decreased vision in the eye on the side of the headache. Mr#: [id number]      History reviewed. No pertinent past medical history. History reviewed. No pertinent surgical history. Family History   Problem Relation Age of Onset    Diabetes Neg Hx     Cancer Neg Hx     Heart Disease Neg Hx     Hypertension Neg Hx        Allergies   Allergen Reactions    Sulfa (Sulfonamide Antibiotics) Swelling       Social History     Tobacco Use   Smoking Status Never Smoker   Smokeless Tobacco Never Used       Social History     Substance and Sexual Activity   Alcohol Use Yes    Comment: occasional          Patient Active Problem List   Diagnosis Code    Cystic acne vulgaris L70.0    Reactive hypoglycemia E16.1    Depression with anxiety F41.8         Current Outpatient Medications:     venlafaxine-SR (Effexor XR) 37.5 mg capsule, Take  by mouth daily. Along with the 150 mg, Disp: , Rfl:     mirtazapine (REMERON) 15 mg tablet, Take 7.5 mg by mouth nightly., Disp: , Rfl:     mv,Ca,min/iron/FA/guarana/caff (ONE-A-DAY WOMEN'S ACTIVE PO), Take  by mouth., Disp: , Rfl:     ethinyl estradiol-etonogestrel (NuvaRing) 0.12-0.015 mg/24 hr vaginal ring, by Intravaginal route., Disp: , Rfl:     venlafaxine-SR (EFFEXOR-XR) 150 mg capsule, Take  by mouth daily. , Disp: , Rfl:        Review of Systems   Constitutional: Negative for chills, fever and weight loss. HENT: Negative for congestion, ear pain, hearing loss and sore throat. Eyes: Positive for photophobia.  Negative for blurred vision and double vision. Respiratory: Negative for cough, shortness of breath and wheezing. Cardiovascular: Negative for chest pain, palpitations and leg swelling. Gastrointestinal: Negative for abdominal pain, blood in stool, constipation, diarrhea, heartburn, melena, nausea and vomiting. Genitourinary: Negative for dysuria and urgency. Musculoskeletal: Negative for joint pain and myalgias. Skin: Negative for itching and rash. Neurological: Positive for dizziness and headaches. Negative for tingling, sensory change and focal weakness. Endo/Heme/Allergies: Negative for environmental allergies. Psychiatric/Behavioral: Positive for depression. Negative for suicidal ideas. The patient is nervous/anxious (better). The patient does not have insomnia. Anxiety and depression followed by psychiatry currently taking mirtazapine, venlafaxine     Visit Vitals  /60   Pulse 90   Temp 98.3 °F (36.8 °C) (Temporal)   Resp 16   Ht 5' 5\" (1.651 m)   Wt 146 lb (66.2 kg)   LMP 04/28/2022   SpO2 98%   BMI 24.30 kg/m²       Physical Exam  Vitals and nursing note reviewed. Constitutional:       General: She is not in acute distress. Appearance: Normal appearance. She is not ill-appearing. HENT:      Head: Normocephalic. Right Ear: Tympanic membrane, ear canal and external ear normal.      Left Ear: Tympanic membrane, ear canal and external ear normal.   Eyes:      Extraocular Movements: Extraocular movements intact. Conjunctiva/sclera: Conjunctivae normal.      Pupils: Pupils are equal, round, and reactive to light. Neck:      Vascular: No carotid bruit. Cardiovascular:      Rate and Rhythm: Normal rate and regular rhythm. Heart sounds: Normal heart sounds. Pulmonary:      Effort: Pulmonary effort is normal.      Breath sounds: Normal breath sounds. Abdominal:      Palpations: Abdomen is soft. Tenderness: There is no abdominal tenderness.    Musculoskeletal: General: No deformity. Cervical back: Neck supple. Right lower leg: No edema. Left lower leg: No edema. Skin:     General: Skin is warm and dry. Neurological:      Mental Status: She is alert and oriented to person, place, and time. Psychiatric:         Mood and Affect: Mood normal.         Behavior: Behavior normal.         ASSESSMENT and PLAN    ICD-10-CM ICD-9-CM    1. Routine general medical examination at a health care facility  Z00.00 V70.0    2. Migraine without aura and without status migrainosus, not intractable  G43.009 346.10 rizatriptan (MAXALT-MLT) 10 mg disintegrating tablet   Assessment:  Migraine headaches    Health maintenance recommendations:  COVID-19 immunization booster  HPV immunization series-completed    Plan  Begin rizatriptan 10 mg Dissolve 1 tablet in mouth at first sign of headache, may repeat dose in 2 hours if needed, do not exceed two tablets in 24 hours  Return for annual physical exam in 1 year or sooner with any problems    Iva Man MD      PLEASE NOTE:   This document has been produced using voice recognition software. Unrecognized errors in transcription may be present.

## 2022-05-17 NOTE — PROGRESS NOTES
Mary Lord is a 23 y.o. female (: 1995) presenting to address:    Chief Complaint   Patient presents with    Migraine     becoming more frequent with weather changes . Vitals:    22 1459   BP: 110/60   Pulse: 90   Resp: 16   Temp: 98.3 °F (36.8 °C)   TempSrc: Temporal   SpO2: 98%   Weight: 146 lb (66.2 kg)   Height: 5' 5\" (1.651 m)   PainSc:   0 - No pain   LMP: 2022       Hearing/Vision:   No exam data present    Learning Assessment:     Learning Assessment 2017   PRIMARY LEARNER Patient   HIGHEST LEVEL OF EDUCATION - PRIMARY LEARNER  SOME COLLEGE   BARRIERS PRIMARY LEARNER NONE     NONE   PRIMARY LANGUAGE ENGLISH   LEARNER PREFERENCE PRIMARY READING   ANSWERED BY patient   RELATIONSHIP SELF     Depression Screening:     3 most recent PHQ Screens 2022   Little interest or pleasure in doing things Not at all   Feeling down, depressed, irritable, or hopeless Not at all   Total Score PHQ 2 0   Trouble falling or staying asleep, or sleeping too much Not at all   Feeling tired or having little energy Not at all   Poor appetite, weight loss, or overeating Not at all   Feeling bad about yourself - or that you are a failure or have let yourself or your family down Not at all   Trouble concentrating on things such as school, work, reading, or watching TV Not at all   Moving or speaking so slowly that other people could have noticed; or the opposite being so fidgety that others notice Not at all   Thoughts of being better off dead, or hurting yourself in some way Not at all   PHQ 9 Score 0   How difficult have these problems made it for you to do your work, take care of your home and get along with others Not difficult at all     Fall Risk Assessment:     Fall Risk Assessment, last 12 mths 3/1/2021   Able to walk? Yes   Fall in past 12 months? 0   Do you feel unsteady?  0   Are you worried about falling 0     Abuse Screening:     Abuse Screening Questionnaire 2021   Do you ever feel afraid of your partner? N   Are you in a relationship with someone who physically or mentally threatens you? N   Is it safe for you to go home? Y     ADL Assessment:   No flowsheet data found. Coordination of Care Questionaire:   1. \"Have you been to the ER, urgent care clinic since your last visit? Hospitalized since your last visit? \" No    2. \"Have you seen or consulted any other health care providers outside of the [address] [place] [address] since your last visit? \" No     3. For patients aged 39-70: Has the patient had a colonoscopy? NA - based on age     If the patient is female:    4. For patients aged 41-77: Has the patient had a mammogram within the past 2 years? NA - based on age    11. For patients aged 21-65: Has the patient had a pap smear? Yes - no Care Gap present    Advanced Directive:   1. Do you have an Advanced Directive? NO    2. Would you like information on Advanced Directives?  NO

## 2022-10-20 NOTE — PROGRESS NOTES
HISTORY OF PRESENT ILLNESS  Faith Syed is a 33 y.o. female. She reports medial left elbow pain after playing volleyball 3-4 weeks ago. She describes a hyperextension type injury hitting the ball. Initially symptoms would resolve between volleyball games but now the symptoms persist.    Mr#: [id number]      History reviewed. No pertinent past medical history. History reviewed. No pertinent surgical history. Family History   Problem Relation Age of Onset    Diabetes Neg Hx     Cancer Neg Hx     Heart Disease Neg Hx     Hypertension Neg Hx        Allergies   Allergen Reactions    Sulfa (Sulfonamide Antibiotics) Swelling       Social History     Tobacco Use   Smoking Status Never   Smokeless Tobacco Never       Social History     Substance and Sexual Activity   Alcohol Use Yes    Comment: occasional          Patient Active Problem List   Diagnosis Code    Cystic acne vulgaris L70.0    Reactive hypoglycemia E16.1    Depression with anxiety F41.8         Current Outpatient Medications:     venlafaxine-SR (EFFEXOR-XR) 37.5 mg capsule, Take  by mouth daily. Along with the 150 mg, Disp: , Rfl:     rizatriptan (MAXALT-MLT) 10 mg disintegrating tablet, Dissolve 1 tablet in mouth at first sign of headache, may repeat dose in 2 hours if needed, do not exceed two tablets in 24 hours, Disp: 15 Tablet, Rfl: 5    mv,Ca,min/iron/FA/guarana/caff (ONE-A-DAY WOMEN'S ACTIVE PO), Take  by mouth., Disp: , Rfl:     ethinyl estradiol-etonogestrel (NUVARING) 0.12-0.015 mg/24 hr vaginal ring, by Intravaginal route., Disp: , Rfl:     venlafaxine-SR (EFFEXOR-XR) 150 mg capsule, Take  by mouth daily. , Disp: , Rfl:        Review of Systems   Constitutional:  Negative for fever and weight loss. Musculoskeletal:  Positive for joint pain (Left elbow). Neurological:  Negative for tingling and focal weakness.      Visit Vitals  /68   Pulse 68   Temp 98.2 °F (36.8 °C) (Temporal)   Resp 16   Ht 5' 5\" (1.651 m)   Wt 135 lb (61.2 kg) LMP 10/19/2022   SpO2 99%   BMI 22.47 kg/m²       Physical Exam  Vitals and nursing note reviewed. Constitutional:       General: She is not in acute distress. Appearance: Normal appearance. She is well-developed. She is not ill-appearing. HENT:      Head: Normocephalic. Eyes:      Extraocular Movements: Extraocular movements intact. Cardiovascular:      Rate and Rhythm: Normal rate. Pulmonary:      Effort: Pulmonary effort is normal.   Musculoskeletal:         General: Tenderness present. Cervical back: Neck supple. Comments: Left elbow with full range of motion including flexion extension supination and pronation without discomfort the joint appears to be stable, medial tenderness to palpation   Neurological:      Mental Status: She is alert and oriented to person, place, and time. Psychiatric:         Mood and Affect: Mood normal.         Behavior: Behavior normal.       ASSESSMENT and PLAN    ICD-10-CM ICD-9-CM    1. Left elbow pain  M25.522 719.42 REFERRAL TO PHYSICAL THERAPY      Assessment:  Posttraumatic medial left elbow pain possibly representing medial epicondylitis    Health maintenance recommendations:  COVID-19 immunization with bivalent vaccine  Influenza immunization declined    Plan:  Discontinue volleyball and other activities that exacerbate the symptoms  Apply moist heat, take OTC NSAIDs as needed  Referral for physical therapy  Follow-up for failure to improve  Return for annual physical exam in late May or sooner with any problems  Please always arrive at least 15 minutes before your scheduled appointment time  Kyle Jiménez MD      PLEASE NOTE:   This document has been produced using voice recognition software. Unrecognized errors in transcription may be present.

## 2022-10-21 ENCOUNTER — OFFICE VISIT (OUTPATIENT)
Dept: FAMILY MEDICINE CLINIC | Age: 27
End: 2022-10-21
Payer: COMMERCIAL

## 2022-10-21 VITALS
HEIGHT: 65 IN | BODY MASS INDEX: 22.49 KG/M2 | HEART RATE: 68 BPM | WEIGHT: 135 LBS | SYSTOLIC BLOOD PRESSURE: 110 MMHG | DIASTOLIC BLOOD PRESSURE: 68 MMHG | OXYGEN SATURATION: 99 % | TEMPERATURE: 98.2 F | RESPIRATION RATE: 16 BRPM

## 2022-10-21 DIAGNOSIS — M25.522 LEFT ELBOW PAIN: Primary | ICD-10-CM

## 2022-10-21 PROCEDURE — 99213 OFFICE O/P EST LOW 20 MIN: CPT | Performed by: FAMILY MEDICINE

## 2022-10-21 NOTE — PATIENT INSTRUCTIONS
Assessment:  Posttraumatic medial left elbow pain possibly representing medial epicondylitis    Health maintenance recommendations:  COVID-19 immunization with bivalent vaccine  Influenza immunization declined    Plan:  Discontinue volleyball and other activities that exacerbate the symptoms  Apply moist heat, take OTC NSAIDs as needed  Referral for physical therapy  Follow-up for failure to improve  Return for annual physical exam in late May or sooner with any problems  Please always arrive at least 15 minutes before your scheduled appointment time

## 2022-10-21 NOTE — PROGRESS NOTES
Kirsten Galvan is a 33 y.o. female (: 1995) presenting to address:    Chief Complaint   Patient presents with    Elbow Pain     Left elbow injured 3 to 4 weeks ago . playing volley ball . She has been using a compression sleeve and Kt tape . But pain comes back whenever she tries to use it . Immunization/Injection     Declined flu shot . Vitals:    10/21/22 1137   BP: 110/68   Pulse: 68   Resp: 16   Temp: 98.2 °F (36.8 °C)   TempSrc: Temporal   SpO2: 99%   Weight: 135 lb (61.2 kg)   Height: 5' 5\" (1.651 m)   PainSc:   4   PainLoc: Elbow   LMP: 10/19/2022       Hearing/Vision:   No results found.     Learning Assessment:     Learning Assessment 2017   PRIMARY LEARNER Patient   HIGHEST LEVEL OF EDUCATION - PRIMARY LEARNER  SOME COLLEGE   BARRIERS PRIMARY LEARNER NONE     NONE   PRIMARY LANGUAGE ENGLISH   LEARNER PREFERENCE PRIMARY READING   ANSWERED BY patient   RELATIONSHIP SELF     Depression Screening:     3 most recent PHQ Screens 2022   Little interest or pleasure in doing things Not at all   Feeling down, depressed, irritable, or hopeless Not at all   Total Score PHQ 2 0   Trouble falling or staying asleep, or sleeping too much Not at all   Feeling tired or having little energy Not at all   Poor appetite, weight loss, or overeating Not at all   Feeling bad about yourself - or that you are a failure or have let yourself or your family down Not at all   Trouble concentrating on things such as school, work, reading, or watching TV Not at all   Moving or speaking so slowly that other people could have noticed; or the opposite being so fidgety that others notice Not at all   Thoughts of being better off dead, or hurting yourself in some way Not at all   PHQ 9 Score 0   How difficult have these problems made it for you to do your work, take care of your home and get along with others Not difficult at all     Fall Risk Assessment:     Fall Risk Assessment, last 12 mths 3/1/2021   Able to walk? Yes   Fall in past 12 months? 0   Do you feel unsteady? 0   Are you worried about falling 0     Abuse Screening:     Abuse Screening Questionnaire 7/12/2021   Do you ever feel afraid of your partner? N   Are you in a relationship with someone who physically or mentally threatens you? N   Is it safe for you to go home? Y     ADL Assessment:   No flowsheet data found. Coordination of Care Questionaire:   1. \"Have you been to the ER, urgent care clinic since your last visit? Hospitalized since your last visit? \" No    2. \"Have you seen or consulted any other health care providers outside of the [address] [place] [address] since your last visit? \" No     3. For patients aged 39-70: Has the patient had a colonoscopy? NA - based on age     If the patient is female:    4. For patients aged 41-77: Has the patient had a mammogram within the past 2 years? NA - based on age    11. For patients aged 21-65: Has the patient had a pap smear? Yes - no Care Gap present    Advanced Directive:   1. Do you have an Advanced Directive? NO    2. Would you like information on Advanced Directives?  NO

## 2022-10-27 NOTE — PROGRESS NOTES
HISTORY OF PRESENT ILLNESS  Halina Rivas is a 33 y.o. female. She reports a recurrent irritating/painful axillary rash. Chart review indicates that she was seen in December 2020 with suspected axillary candidal intertrigo and was treated successfully with Lotrisone cream.  She now notes that this seems to recur about every 3 to 4 weeks and resolves after 2-3 days of Lotrisone applications. She additionally reports concern about a wart on the left fifth finger and a small papule on the right aspect of the tip of her nose. Mr#: [id number]      History reviewed. No pertinent past medical history. History reviewed. No pertinent surgical history. Family History   Problem Relation Age of Onset    Diabetes Neg Hx     Cancer Neg Hx     Heart Disease Neg Hx     Hypertension Neg Hx        Allergies   Allergen Reactions    Sulfa (Sulfonamide Antibiotics) Swelling       Social History     Tobacco Use   Smoking Status Never   Smokeless Tobacco Never       Social History     Substance and Sexual Activity   Alcohol Use Yes    Comment: occasional          Patient Active Problem List   Diagnosis Code    Cystic acne vulgaris L70.0    Reactive hypoglycemia E16.1    Depression with anxiety F41.8         Current Outpatient Medications:     venlafaxine-SR (EFFEXOR-XR) 37.5 mg capsule, Take  by mouth daily. Along with the 150 mg, Disp: , Rfl:     rizatriptan (MAXALT-MLT) 10 mg disintegrating tablet, Dissolve 1 tablet in mouth at first sign of headache, may repeat dose in 2 hours if needed, do not exceed two tablets in 24 hours, Disp: 15 Tablet, Rfl: 5    mv,Ca,min/iron/FA/guarana/caff (ONE-A-DAY WOMEN'S ACTIVE PO), Take  by mouth., Disp: , Rfl:     venlafaxine-SR (EFFEXOR-XR) 150 mg capsule, Take  by mouth daily. , Disp: , Rfl:        Review of Systems   Constitutional:  Negative for fever. Skin:  Positive for rash (Recurrent underarm rash, wart left fifth finger, papule on the nose).      Visit Vitals  /78   Pulse 71 Temp 98.1 °F (36.7 °C) (Temporal)   Resp 16   Ht 5' 5\" (1.651 m)   Wt 134 lb (60.8 kg)   LMP 10/19/2022   SpO2 99%   BMI 22.30 kg/m²       Physical Exam  Vitals and nursing note reviewed. Constitutional:       General: She is not in acute distress. Appearance: Normal appearance. She is well-developed. She is not ill-appearing. HENT:      Head: Normocephalic. Eyes:      Extraocular Movements: Extraocular movements intact. Cardiovascular:      Rate and Rhythm: Normal rate. Pulmonary:      Effort: Pulmonary effort is normal.   Musculoskeletal:      Cervical back: Neck supple. Skin:     General: Skin is warm and dry. Findings: Lesion (Wall slight work volar left fourth finger, small benign-appearing papule right tip of the nose) present. No rash (Left axillary area with no abnormalities on exam today). Neurological:      Mental Status: She is alert and oriented to person, place, and time. Psychiatric:         Mood and Affect: Mood normal.         Behavior: Behavior normal.       ASSESSMENT and PLAN    ICD-10-CM ICD-9-CM    1. Candidal intertrigo  B37.2 112.3 REFERRAL TO DERMATOLOGY      clotrimazole-betamethasone (LOTRISONE) topical cream      2. Viral wart on finger  B07.9 078.10 REFERRAL TO DERMATOLOGY      3. Benign papule  R23.8 709.8 REFERRAL TO DERMATOLOGY      Assessment:  Recurrent underarm candidal intertrigo  Verrucous vulgaris lesion left fifth finger  Benign nasal papule    Health maintenance recommendations:  COVID-19 immunization with bivalent vaccine    Plan:  Continue as needed use of [address]  Dermatology referral  Return for annual physical after 5/17/2023 or sooner with any problems  Please always arrive at least 15 minutes before your scheduled appointment time  Verner Morin, MD      PLEASE NOTE:   This document has been produced using voice recognition software. Unrecognized errors in transcription may be present.       Verner Morin, MD      PLEASE NOTE:   This document has been produced using voice recognition software. Unrecognized errors in transcription may be present.

## 2022-10-31 ENCOUNTER — HOSPITAL ENCOUNTER (OUTPATIENT)
Dept: PHYSICAL THERAPY | Age: 27
Discharge: HOME | End: 2022-10-31
Payer: COMMERCIAL

## 2022-10-31 ENCOUNTER — TELEPHONE (OUTPATIENT)
Dept: PHYSICAL THERAPY | Age: 27
End: 2022-10-31

## 2022-10-31 ENCOUNTER — OFFICE VISIT (OUTPATIENT)
Dept: FAMILY MEDICINE CLINIC | Age: 27
End: 2022-10-31
Payer: COMMERCIAL

## 2022-10-31 VITALS
SYSTOLIC BLOOD PRESSURE: 110 MMHG | DIASTOLIC BLOOD PRESSURE: 78 MMHG | OXYGEN SATURATION: 99 % | HEART RATE: 71 BPM | BODY MASS INDEX: 22.33 KG/M2 | TEMPERATURE: 98.1 F | HEIGHT: 65 IN | WEIGHT: 134 LBS | RESPIRATION RATE: 16 BRPM

## 2022-10-31 DIAGNOSIS — B37.2 CANDIDAL INTERTRIGO: Primary | ICD-10-CM

## 2022-10-31 DIAGNOSIS — R23.8 BENIGN PAPULE: ICD-10-CM

## 2022-10-31 DIAGNOSIS — B07.9 VIRAL WART ON FINGER: ICD-10-CM

## 2022-10-31 PROCEDURE — 97161 PT EVAL LOW COMPLEX 20 MIN: CPT

## 2022-10-31 PROCEDURE — 99213 OFFICE O/P EST LOW 20 MIN: CPT | Performed by: FAMILY MEDICINE

## 2022-10-31 RX ORDER — CLOTRIMAZOLE AND BETAMETHASONE DIPROPIONATE 10; .64 MG/G; MG/G
CREAM TOPICAL
Qty: 30 G | Refills: 2 | Status: SHIPPED | OUTPATIENT
Start: 2022-10-31

## 2022-10-31 NOTE — PROGRESS NOTES
Danilo Rosen is a 33 y.o. female (: 1995) presenting to address:    Chief Complaint   Patient presents with    Skin Problem     Yeast infection both armpits     Referral Follow Up     Needs a referral for Derm bump on nose and finger would like removed        Vitals:    10/31/22 0731   BP: 110/78   Pulse: 71   Resp: 16   Temp: 98.1 °F (36.7 °C)   TempSrc: Temporal   SpO2: 99%   Weight: 134 lb (60.8 kg)   Height: 5' 5\" (1.651 m)   PainSc:   0 - No pain   LMP: 10/19/2022       Hearing/Vision:   No results found. Learning Assessment:     Learning Assessment 2017   PRIMARY LEARNER Patient   HIGHEST LEVEL OF EDUCATION - PRIMARY LEARNER  SOME COLLEGE   BARRIERS PRIMARY LEARNER NONE     NONE   PRIMARY LANGUAGE ENGLISH   LEARNER PREFERENCE PRIMARY READING   ANSWERED BY patient   RELATIONSHIP SELF     Depression Screening:     3 most recent PHQ Screens 10/21/2022   Little interest or pleasure in doing things Not at all   Feeling down, depressed, irritable, or hopeless Not at all   Total Score PHQ 2 0   Trouble falling or staying asleep, or sleeping too much Not at all   Feeling tired or having little energy Not at all   Poor appetite, weight loss, or overeating Not at all   Feeling bad about yourself - or that you are a failure or have let yourself or your family down Not at all   Trouble concentrating on things such as school, work, reading, or watching TV Not at all   Moving or speaking so slowly that other people could have noticed; or the opposite being so fidgety that others notice Not at all   Thoughts of being better off dead, or hurting yourself in some way Not at all   PHQ 9 Score 0   How difficult have these problems made it for you to do your work, take care of your home and get along with others Not difficult at all     Fall Risk Assessment:     Fall Risk Assessment, last 12 mths 3/1/2021   Able to walk? Yes   Fall in past 12 months? 0   Do you feel unsteady?  0   Are you worried about falling 0 Abuse Screening:     Abuse Screening Questionnaire 7/12/2021   Do you ever feel afraid of your partner? N   Are you in a relationship with someone who physically or mentally threatens you? N   Is it safe for you to go home? Y     ADL Assessment:   No flowsheet data found. Coordination of Care Questionaire:   1. \"Have you been to the ER, urgent care clinic since your last visit? Hospitalized since your last visit? \" No    2. \"Have you seen or consulted any other health care providers outside of the [address] [place] [address] since your last visit? \" No     3. For patients aged 39-70: Has the patient had a colonoscopy? N/a based on age     If the patient is female:    4. For patients aged 41-77: Has the patient had a mammogram within the past 2 years? No    5. For patients aged 21-65: Has the patient had a pap smear? Yes - no Care Gap present    Advanced Directive:   1. Do you have an Advanced Directive? NO    2. Would you like information on Advanced Directives?  NO

## 2022-10-31 NOTE — PROGRESS NOTES
PHYSICAL THERAPY - DAILY TREATMENT NOTE    Patient Name: Danilo Rosen        Date: 10/31/2022  : 1995   yes Patient  Verified  Visit #:     Insurance: Payor: Beryl Valdez / Plan: [employer] [place] HMO / Product Type: HMO /      In time: 8:50 am Out time: 9:30 am   Total Treatment Time: 40     Medicare/BCBS Time Tracking (below)   Total Timed Codes (min):  - 1:1 Treatment Time:  -     TREATMENT AREA =  Pain in left elbow [M25.522]    SUBJECTIVE  Pain Level (on 0 to 10 scale):  0  / 10   Medication Changes/New allergies or changes in medical history, any new surgeries or procedures?    no  If yes, update Summary List   Subjective Functional Status/Changes:  []  No changes reported     See POC          OBJECTIVE    5 min Manual Therapy: STM/DTM to surrounding musculature   Rationale:      decrease pain, increase tissue extensibility, and decrease trigger points to improve patient's ability to to improve patient's ability to perform functional mobility tasks and activities. The manual therapy interventions were performed at a separate and distinct time from the therapeutic activities interventions. 5 min Self Care: Discussed diagnosis, prognosis, POC/PT Expectations (HEP performance and regular attendance), HEP/exercises, ice versus heat. Rationale:    Patient Education to improve the patients ability to to improve patient's ability to perform functional mobility tasks and activities. Billed With/As:   [] TE   [] TA   [] Neuro   [] Self Care Patient Education: [x] Review HEP    [] Progressed/Changed HEP based on:   [] positioning   [] body mechanics   [] transfers   [] heat/ice application    [] other:      Other Objective/Functional Measures:    See POC     Post Treatment Pain Level (on 0 to 10) scale:   2  / 10     ASSESSMENT  Assessment/Changes in Function:     See POC.      []  See Progress Note/Recertification   Patient will continue to benefit from skilled PT services to modify and progress therapeutic interventions, address functional mobility deficits, address ROM deficits, address strength deficits, analyze and address soft tissue restrictions, analyze and cue movement patterns, analyze and modify body mechanics/ergonomics, and assess and modify postural abnormalities to attain remaining goals.    Progress toward goals / Updated goals:    See POC     PLAN  [x]  Upgrade activities as tolerated yes Continue plan of care   []  Discharge due to :    []  Other:      Therapist: José Oliver, PT    Date: 10/31/2022 Time: 6:49 AM     Future Appointments   Date Time Provider Azeem Krishna   10/31/2022  7:30 AM Salvatore Petersen MD [facility]   10/31/2022  8:50 AM Clau Medrano, PT [facility]

## 2022-10-31 NOTE — PATIENT INSTRUCTIONS
Assessment:  Recurrent underarm candidal intertrigo  Verrucous vulgaris lesion left fifth finger  Benign nasal papule    Health maintenance recommendations:  COVID-19 immunization with bivalent vaccine    Plan:  Continue as needed use of [address]  Dermatology referral  Return for annual physical after 5/17/2023 or sooner with any problems

## 2022-10-31 NOTE — THERAPY EVALUATION
[facility], [facility] [[email or web address], [address] [facility] - Phone: [phone]  Fax: [phone] / [address] [facility]  Patient Name: Claudia Andrews : 1995   Medical   Diagnosis: Left elbow pain, medial epicondylitis Treatment Diagnosis: Pain in left elbow [M25.522]   Onset Date: 2022     Referral Source: Shruthi Brady MD Start of Care [facility]): 10/31/2022   Prior Hospitalization: See medical history Provider #: [id number]   Prior Level of Function: Ind and pain free with ADLs and activities of leisure   Comorbidities: Depression. Medications: Verified on Patient Summary List   The Plan of Care and following information is based on the information from the initial evaluation.   ===========================================================================================  Assessment / key information:  Patient is a 33year old female. Primary complaints of left elbow pain that began in 2022 due to a hyperextension injury sustained playing volleyball. Locates pain at medial elbow in area of ulnar nerve groove. Rates pain as 0-6/10 from best to worst. Pain today is rated as 0/10. Describes pain as sharp shooting and intermittent in nature with use and load. Meds (aleve), rest, KT tape (golfer's elbow tape method) makes the pain better. Playing volleyball, lifting any weight makes the pain worse. Patient is employed as a  at [employer], which requires lifting and restraining animals. Patient enjoys taking part in volleyball and yoga. Their primary goals in PT are to reduce pain in order to take part in these activities of leisure. Patient recently saw their referring provider who prescribed OPPT.  They are expected to return to their provider, however she has not scheduled anything yet.  ===========================================================================================  Objective Measures:  Observation: Pleasant and cooperative female. Appears stated age. Posture: Posture is good. Not contributory to current ailment  Palpation: Medial flexor aponeurosis non-tender to palpation. No trigger points noted. Pain with compression of UN groove in flexion. Improvement of pain with compression in extension. ROM: WNL. Pain with extension. Improved with compression of ulnar groove/UN. Strength: Biceps 4/5; Triceps 4-/5; Pro 4+/5; Sup 3+/5  Flexibility: Normal flexibility to all muscle groups  Skin Integrity: Intact  Swelling/Edema: None appreciated. Positional Tolerances: Patient with inability to weight bear through L UE without pain. FOTO Score: 53/100 Goal ---> 75/100    Impression: hyperextension injury of the left elbow, triceps insertional irritability as well as ulnar nerve symptoms. Responds favorably to compression of tendon and at location of UN groove. Baseline weakness of the UEs with recent return to sport with no prior exercise or preparation for volleyball. 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 Patient will benefit from overall general strengthening and will transition to more sport specific strengthening to provide more stability to elbow. Patient is agreeable to plan. HEP Provided:  - Discussed anatomy and diagnosis of medial epicondylitis. - Discussed PT and POC moving forwards. - Discussed expectations of HEP and attendance in PT.  - Discussed ice versus heat for current symptoms.  - Discussed elbow strap for work and sport.  Will trial strap prior to any anti-extension bracing.    ===========================================================================================  Eval Complexity: History LOW Complexity : Zero comorbidities / personal factors that will impact the outcome / POC;  Examination  LOW Complexity : 1-2 Standardized tests and measures addressing body structure, function, activity limitation and / or participation in recreation ; Presentation MEDIUM Complexity : Evolving with changing characteristics ; Decision Making MEDIUM Complexity : FOTO score of 26-74; Overall Complexity LOW   Problem List: pain affecting function, decrease ROM, decrease strength, decrease ADL/ functional abilitiies, decrease activity tolerance, and decrease flexibility/ joint mobility   Treatment Plan may include any combination of the following: Therapeutic exercise, Neuromuscular reeducation, Manual therapy, Therapeutic activity, Self care/home management, Electric stim unattended , Ultrasound, and Electric stim attended  Patient / Family readiness to learn indicated by: asking questions, trying to perform skills, and interest  Persons(s) to be included in education: patient (P)  Barriers to Learning/Limitations: None  Measures taken, if barriers to learning:    Patient Goal (s): Patient would like to return to volleyball and pain free walking dogs and yoga   Patient self reported health status: good  Rehabilitation Potential: good  Short Term Goals: To be accomplished in  3  weeks:  Patient will show compliance with regular HEP performance as prescribed by PT. Patient will reduce max pain to 3/10 in order to perform iADLs and ADLs more comfortably. Patient will improve UE strength to 4+/5 for prolonged periods of sitting and object manipulation. Patient will improve GROC score to +2 for improved perception of functional mobility and symptoms. Long Term Goals: To be accomplished in  6  weeks:  Patient will reduce max pain to 1/10 in order to perform yoga, dog walking and volleyball. Patient will improve UE strength to 5/5 in order to return to yoga, dog walking and volleyball.   Patient will improve FOTO score to 75 for improved functional mobility and to return to PLOF. Frequency / Duration:   Patient to be seen  1-2  times per week for 6  weeks:  Patient / Caregiver education and instruction: self care, activity modification, and exercises  Therapist Signature: Constance Sorto PT Date: 86/73/6407   Certification Period: NA Time: 6:49 AM   ===========================================================================================  I certify that the above Physical Therapy Services are being furnished while the patient is under my care. I agree with the treatment plan and certify that this therapy is necessary. Physician Signature:        Date:       Time:                                        Gisselle Brothers MD  Please sign and return to [facility] at [facility], [facility]. or you may fax the signed copy to [phone]. Thank you.

## 2022-11-10 ENCOUNTER — HOSPITAL ENCOUNTER (OUTPATIENT)
Dept: PHYSICAL THERAPY | Age: 27
Discharge: HOME | End: 2022-11-10
Payer: COMMERCIAL

## 2022-11-10 PROCEDURE — 97140 MANUAL THERAPY 1/> REGIONS: CPT

## 2022-11-10 PROCEDURE — 97110 THERAPEUTIC EXERCISES: CPT

## 2022-11-10 PROCEDURE — 97535 SELF CARE MNGMENT TRAINING: CPT
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
## 2022-11-17 ENCOUNTER — APPOINTMENT (OUTPATIENT)
Dept: PHYSICAL THERAPY | Age: 27
End: 2022-11-17
Payer: COMMERCIAL

## 2022-12-01 ENCOUNTER — HOSPITAL ENCOUNTER (OUTPATIENT)
Dept: PHYSICAL THERAPY | Age: 27
Discharge: HOME | End: 2022-12-01
Payer: COMMERCIAL

## 2022-12-01 PROCEDURE — 97140 MANUAL THERAPY 1/> REGIONS: CPT

## 2022-12-01 PROCEDURE — 97110 THERAPEUTIC EXERCISES: CPT

## 2022-12-01 NOTE — PROGRESS NOTES
PHYSICAL THERAPY - DAILY TREATMENT NOTE    Patient Name: Trever Doctor        Date: 2022  : 1995   yes Patient  Verified  Visit #:   3   of   12  Insurance: Payor: Emmanuel Davalos / [address]: [address] / Product Type: Commerical /      In time: 7:30 am Out time: 8:00 am   Total Treatment Time: 30     Medicare/[facility] Time Tracking (below)   Total Timed Codes (min):  - 1:1 Treatment Time:  -     TREATMENT AREA =  Pain in left elbow [M25.522]    SUBJECTIVE  Pain Level (on 0 to 10 scale):  0  / 10   Medication Changes/New allergies or changes in medical history, any new surgeries or procedures?    no  If yes, update Summary List   Subjective Functional Status/Changes:  []  No changes reported     Patient reports she has not felt any pain since before her last visit. She has returned to doing yoga for exercise and has been doing her HEP with no issues. She is to take 2 weeks off from PT and call to alert PT if she is ready for d/c. OBJECTIVE  Modalities Rationale:     decrease inflammation and decrease pain to improve patient's ability to weight bear through UE, bend, lift and carry.    min [] Estim, type/location:                                      []  att     []  unatt     []  w/US     []  w/ice    []  w/heat    min []  Mechanical Traction: type/lbs                   []  pro   []  sup   []  int   []  cont    []  before manual    []  after manual    min []  Ultrasound, settings/location:      min []  Iontophoresis w/ dexamethasone, location:                                               []  take home patch       []  in clinic   10 min [x]  Ice     []  Heat    location/position: Left elbow; ant/post; seated with UE on pillow    min []  Vasopneumatic Device, press/temp:    If using vaso (only need to measure limb vaso being performed on)      pre-treatment girth :       post-treatment girth :       measured at (landmark location) :      min []  Other:    [x] Skin assessment post-treatment (if applicable):    [x]  intact    []  redness- no adverse reaction                  []redness - adverse reaction:      15 min Therapeutic Exercise:  [x]  See flow sheet   Rationale:      increase ROM and increase strength to improve the patients ability to weight bear through UE, bend, lift and carry. 15 min Manual Therapy: DTM to triceps insertion, and forearm musculature   Rationale:      decrease pain, increase ROM, increase tissue extensibility, decrease trigger points, and increase postural awareness to improve patient's ability to weight bear through UE, bend, lift and carry. The manual therapy interventions were performed at a separate and distinct time from the therapeutic activities interventions. Billed With/As:   [x] TE   [] TA   [] Neuro   [] Self Care Patient Education: [x] Review HEP    [] Progressed/Changed HEP based on:   [] positioning   [] body mechanics   [] transfers   [] heat/ice application    [] other:      Other Objective/Functional Measures:    Verbal or Tactile cues required: None required for form or sequencing indicating good HEP performance  Posture/positioning: Good and upright  ROM: WNL; full Rom noted and in weightbearing  Palpation: Some mild trigger points at lateral triceps muscle belly; no TTP    Therex and MT as per flow sheet. Post Treatment Pain Level (on 0 to 10) scale:   0  / 10     ASSESSMENT  Assessment/Changes in Function:     Patient tolerated today's treatment well. Progressed/added therex as follows: No progressions. Discussed independent HEP x 2 weeks and PT will call to confirm D/C or more visits pending patient progress with patient. Patient is agreeable. Continue to progress as tolerated.      []  See Progress Note/Recertification   Patient will continue to benefit from skilled PT services to modify and progress therapeutic interventions, address functional mobility deficits, address ROM deficits, address strength deficits, analyze and address soft tissue restrictions, analyze and cue movement patterns, analyze and modify body mechanics/ergonomics, and assess and modify postural abnormalities to attain remaining goals. Progress toward goals / Updated goals:    Patient making great progress towards all goals at this time. Pain diminished. ROM and strength WNL. Patient to trial independent HEP x 2 weeks to assess if d/c is appropriate. All goals remain applicable.      PLAN  [x]  Upgrade activities as tolerated yes Continue plan of care   []  Discharge due to :    []  Other:      Therapist: Francis Gastelum, PT    Date: 12/1/2022 Time: 6:43 AM     Future Appointments   Date Time Provider Azeem Krishna   12/1/2022  7:30 AM Fernando Jerry, PT [facility] [address]

## 2022-12-20 NOTE — THERAPY DISCHARGE
[address] [facility] PHYSICAL THERAPY  [address] [place] [address], [facility] [address],[place], [address] [facility] - Phone: [phone]  Fax: [phone]    DISCHARGE NOTE  Patient Name: Akilah Cook : 1995   Treatment/Medical Diagnosis: Pain in left elbow [M25.522]   Referral Source: Gio Kitchen MD     Date of Initial Visit: 10/31/2022 Attended Visits: 3 Missed Visits: 0       SUMMARY OF TREATMENT  Pt attended initial evaluation and     2     follow-ups. She was provided a thorough HEP and ways to manage symptoms. She has been doing very well for the last 3 weeks with one occurrence of elbow symptoms that went away immediately. Patient has trialed a independent HEP for the last two weeks with good result and is ready for discharge. Patient does not wish to return to formal PT and a reassessment has not been done due to this. RECOMMENDATIONS  Discontinue physical therapy due to achievement of goals and good understanding of HEP and symptom management      If you have any questions/comments please contact us directly at [phone]. Thank you for allowing us to assist in the care of your patient.     Therapist Signature: Anju Matos, PT Date: 2022     Time: 7:30 AM

## 2023-01-24 ENCOUNTER — TELEPHONE (OUTPATIENT)
Dept: FAMILY MEDICINE CLINIC | Age: 28
End: 2023-01-24

## 2023-01-24 NOTE — TELEPHONE ENCOUNTER
----- Message from Marcela Hannon sent at 1/24/2023  1:20 PM EST -----  Subject: Message to Provider    QUESTIONS  Information for Provider? Patient is scheduled for 02/08 with Dr. Anjana Dsouza but   would like to be put on a cancellation list. Dog bite wound check. Please   call if you can get her in sooner. ---------------------------------------------------------------------------  --------------  [facility]  [phone]; OK to leave message on voicemail  ---------------------------------------------------------------------------  --------------  SCRIPT ANSWERS  Relationship to Patient?  Self

## 2023-01-25 ENCOUNTER — OFFICE VISIT (OUTPATIENT)
Dept: FAMILY MEDICINE CLINIC | Age: 28
End: 2023-01-25
Payer: COMMERCIAL

## 2023-01-25 VITALS
BODY MASS INDEX: 22.16 KG/M2 | SYSTOLIC BLOOD PRESSURE: 108 MMHG | WEIGHT: 133 LBS | OXYGEN SATURATION: 98 % | RESPIRATION RATE: 16 BRPM | HEIGHT: 65 IN | TEMPERATURE: 98 F | HEART RATE: 78 BPM | DIASTOLIC BLOOD PRESSURE: 70 MMHG

## 2023-01-25 DIAGNOSIS — M25.542 PAIN INVOLVING JOINT OF FINGER OF LEFT HAND: Primary | ICD-10-CM

## 2023-01-25 PROCEDURE — 99213 OFFICE O/P EST LOW 20 MIN: CPT | Performed by: FAMILY MEDICINE

## 2023-01-25 NOTE — PROGRESS NOTES
Renata Calero is a 33 y.o. female (: 1995) presenting to address:    Chief Complaint   Patient presents with    Finger Pain     After dog bite in December . Still having trouble with middle finger on left hand . Vitals:    23 1435   BP: 108/70   Pulse: 78   Resp: 16   Temp: 98 °F (36.7 °C)   TempSrc: Temporal   SpO2: 98%   Weight: 133 lb (60.3 kg)   Height: 5' 5\" (1.651 m)   PainSc:   2   PainLoc: Finger   LMP: 2023       Hearing/Vision:   No results found. Learning Assessment:     Learning Assessment 2017   PRIMARY LEARNER Patient   HIGHEST LEVEL OF EDUCATION - PRIMARY LEARNER  SOME COLLEGE   BARRIERS PRIMARY LEARNER NONE     NONE   PRIMARY LANGUAGE ENGLISH   LEARNER PREFERENCE PRIMARY READING   ANSWERED BY patient   RELATIONSHIP SELF     Depression Screening:     3 most recent PHQ Screens 10/21/2022   Little interest or pleasure in doing things Not at all   Feeling down, depressed, irritable, or hopeless Not at all   Total Score PHQ 2 0   Trouble falling or staying asleep, or sleeping too much Not at all   Feeling tired or having little energy Not at all   Poor appetite, weight loss, or overeating Not at all   Feeling bad about yourself - or that you are a failure or have let yourself or your family down Not at all   Trouble concentrating on things such as school, work, reading, or watching TV Not at all   Moving or speaking so slowly that other people could have noticed; or the opposite being so fidgety that others notice Not at all   Thoughts of being better off dead, or hurting yourself in some way Not at all   PHQ 9 Score 0   How difficult have these problems made it for you to do your work, take care of your home and get along with others Not difficult at all     Fall Risk Assessment:     Fall Risk Assessment, last 12 mths 3/1/2021   Able to walk? Yes   Fall in past 12 months? 0   Do you feel unsteady?  0   Are you worried about falling 0     Abuse Screening:     Abuse Screening Questionnaire 7/12/2021   Do you ever feel afraid of your partner? N   Are you in a relationship with someone who physically or mentally threatens you? N   Is it safe for you to go home? Y     ADL Assessment:   No flowsheet data found. Coordination of Care Questionaire:   1. \"Have you been to the ER, urgent care clinic since your last visit? Hospitalized since your last visit? \"Yes last month see [facility] . 2. \"Have you seen or consulted any other health care providers outside of the [address] [place] [address] since your last visit? \" No     3. For patients aged 39-70: Has the patient had a colonoscopy? NA - based on age     If the patient is female:    4. For patients aged 41-77: Has the patient had a mammogram within the past 2 years? NA - based on age    11. For patients aged 21-65: Has the patient had a pap smear? Yes - no Care Gap present    Advanced Directive:   1. Do you have an Advanced Directive? NO    2. Would you like information on Advanced Directives?  NO

## 2023-01-25 NOTE — PATIENT INSTRUCTIONS
Assessment:  Persistent pain and tenderness dorsal distal interphalangeal joint third finger left hand following a dog bite 6 weeks ago    Plan:  X-ray views today, further disposition pending results of indicated  Consider hand surgery referral

## 2023-01-25 NOTE — PROGRESS NOTES
HISTORY OF PRESENT ILLNESS  Emerson Wing is a 33 y.o. female. She reports persistent pain in the third finger of the left hand. She sustained a finger laceration to her left 4th finger as well as puncture wound to her left third finger breaking up a dog fight about 6 weeks ago [facility] x-rays of the fourth finger showed no evidence of bony injury and the laceration was suture repaired. The puncture wound on the third finger was left open and she was treated with a course of Augmentin. Today she reports that all of the symptoms involving the fourth finger have resolved but she continues to have pain and tenderness at the puncture wound site on the dorsal distal third finger. Finger Pain      Mr#: [id number]      History reviewed. No pertinent past medical history. History reviewed. No pertinent surgical history. Family History   Problem Relation Age of Onset    Diabetes Neg Hx     Cancer Neg Hx     Heart Disease Neg Hx     Hypertension Neg Hx        Allergies   Allergen Reactions    Sulfa (Sulfonamide Antibiotics) Swelling       Social History     Tobacco Use   Smoking Status Never   Smokeless Tobacco Never       Social History     Substance and Sexual Activity   Alcohol Use Yes    Comment: occasional          Patient Active Problem List   Diagnosis Code    Cystic acne vulgaris L70.0    Reactive hypoglycemia E16.1    Depression with anxiety F41.8         Current Outpatient Medications:     clotrimazole-betamethasone (LOTRISONE) topical cream, Apply to affected area twice daily until symptoms resolve, Disp: 30 g, Rfl: 2    venlafaxine-SR (EFFEXOR-XR) 37.5 mg capsule, Take 75 mg by mouth daily.  Along with the 150 mg, Disp: , Rfl:     rizatriptan (MAXALT-MLT) 10 mg disintegrating tablet, Dissolve 1 tablet in mouth at first sign of headache, may repeat dose in 2 hours if needed, do not exceed two tablets in 24 hours, Disp: 15 Tablet, Rfl: 5 venlafaxine-SR (EFFEXOR-XR) 150 mg capsule, Take  by mouth daily. , Disp: , Rfl:        Review of Systems   Constitutional:  Negative for fever. Musculoskeletal:  Positive for joint pain (DIP joint left third finger). Neurological:  Negative for tingling and focal weakness. Visit Vitals  /70   Pulse 78   Temp 98 °F (36.7 °C) (Temporal)   Resp 16   Ht 5' 5\" (1.651 m)   Wt 133 lb (60.3 kg)   LMP 01/16/2023   SpO2 98%   BMI 22.13 kg/m²       Physical Exam  Vitals and nursing note reviewed. Constitutional:       General: She is not in acute distress. Appearance: Normal appearance. She is well-developed. She is not ill-appearing. HENT:      Head: Normocephalic. Eyes:      Extraocular Movements: Extraocular movements intact. Cardiovascular:      Rate and Rhythm: Normal rate. Pulmonary:      Effort: Pulmonary effort is normal.   Musculoskeletal:         General: Tenderness and signs of injury present. No deformity. Cervical back: Neck supple. Comments: Puncture wound site at the dorsal DIP joint of the left third finger has an area of erythema and papular changes compatible with a healing wound. The area is tender there is no surrounding erythema or induration. There is no edema or induration of the finger and the joint shows full range of motion. Skin:     General: Skin is warm and dry. Neurological:      Mental Status: She is alert and oriented to person, place, and time. Psychiatric:         Mood and Affect: Mood normal.         Behavior: Behavior normal.       ASSESSMENT and PLAN    ICD-10-CM ICD-9-CM    1.  Pain involving joint of finger of left hand  M25.542 719.44 XR 3RD FINGER LT MIN 2 V      Assessment:  Persistent pain and tenderness dorsal distal interphalangeal joint third finger left hand following a dog bite 6 weeks ago    Plan:  X-ray views today, further disposition pending results of indicated  Consider hand surgery referral  Hollis Lynne MD      PLEASE NOTE: This document has been produced using voice recognition software. Unrecognized errors in transcription may be present.